# Patient Record
Sex: FEMALE | Race: BLACK OR AFRICAN AMERICAN | NOT HISPANIC OR LATINO | URBAN - METROPOLITAN AREA
[De-identification: names, ages, dates, MRNs, and addresses within clinical notes are randomized per-mention and may not be internally consistent; named-entity substitution may affect disease eponyms.]

---

## 2023-03-31 LAB
ALANINE AMINOTRANSFERASE (SGPT) (U/L) IN SER/PLAS: 11 U/L (ref 7–45)
ALBUMIN (G/DL) IN SER/PLAS: 3.6 G/DL (ref 3.4–5)
ALKALINE PHOSPHATASE (U/L) IN SER/PLAS: 109 U/L (ref 33–136)
ANION GAP IN SER/PLAS: 14 MMOL/L (ref 10–20)
ASPARTATE AMINOTRANSFERASE (SGOT) (U/L) IN SER/PLAS: 14 U/L (ref 9–39)
BILIRUBIN TOTAL (MG/DL) IN SER/PLAS: 0.3 MG/DL (ref 0–1.2)
CALCIUM (MG/DL) IN SER/PLAS: 8.8 MG/DL (ref 8.6–10.3)
CARBON DIOXIDE, TOTAL (MMOL/L) IN SER/PLAS: 27 MMOL/L (ref 21–32)
CHLORIDE (MMOL/L) IN SER/PLAS: 105 MMOL/L (ref 98–107)
CHOLESTEROL (MG/DL) IN SER/PLAS: 223 MG/DL (ref 0–199)
CHOLESTEROL IN HDL (MG/DL) IN SER/PLAS: 58.1 MG/DL
CHOLESTEROL/HDL RATIO: 3.8
CREATININE (MG/DL) IN SER/PLAS: 1.08 MG/DL (ref 0.5–1.05)
ERYTHROCYTE DISTRIBUTION WIDTH (RATIO) BY AUTOMATED COUNT: 14.9 % (ref 11.5–14.5)
ERYTHROCYTE MEAN CORPUSCULAR HEMOGLOBIN CONCENTRATION (G/DL) BY AUTOMATED: 30.9 G/DL (ref 32–36)
ERYTHROCYTE MEAN CORPUSCULAR VOLUME (FL) BY AUTOMATED COUNT: 82 FL (ref 80–100)
ERYTHROCYTES (10*6/UL) IN BLOOD BY AUTOMATED COUNT: 4.21 X10E12/L (ref 4–5.2)
GFR FEMALE: 51 ML/MIN/1.73M2
GLUCOSE (MG/DL) IN SER/PLAS: 95 MG/DL (ref 74–99)
HEMATOCRIT (%) IN BLOOD BY AUTOMATED COUNT: 34.6 % (ref 36–46)
HEMOGLOBIN (G/DL) IN BLOOD: 10.7 G/DL (ref 12–16)
LEUKOCYTES (10*3/UL) IN BLOOD BY AUTOMATED COUNT: 6.8 X10E9/L (ref 4.4–11.3)
NATRIURETIC PEPTIDE B (PG/ML) IN SER/PLAS: 104 PG/ML (ref 0–99)
NON-HDL CHOLESTEROL: 165 MG/DL
PLATELETS (10*3/UL) IN BLOOD AUTOMATED COUNT: 283 X10E9/L (ref 150–450)
POTASSIUM (MMOL/L) IN SER/PLAS: 4.5 MMOL/L (ref 3.5–5.3)
PROTEIN TOTAL: 7.2 G/DL (ref 6.4–8.2)
SODIUM (MMOL/L) IN SER/PLAS: 141 MMOL/L (ref 136–145)
THYROTROPIN (MIU/L) IN SER/PLAS BY DETECTION LIMIT <= 0.05 MIU/L: 1.78 MIU/L (ref 0.44–3.98)
UREA NITROGEN (MG/DL) IN SER/PLAS: 20 MG/DL (ref 6–23)

## 2023-04-01 LAB
CALCIDIOL (25 OH VITAMIN D3) (NG/ML) IN SER/PLAS: 52 NG/ML
ESTIMATED AVERAGE GLUCOSE FOR HBA1C: 134 MG/DL
HEMOGLOBIN A1C/HEMOGLOBIN TOTAL IN BLOOD: 6.3 %

## 2023-08-18 LAB
ALANINE AMINOTRANSFERASE (SGPT) (U/L) IN SER/PLAS: 13 U/L (ref 7–45)
ALBUMIN (G/DL) IN SER/PLAS: 3.5 G/DL (ref 3.4–5)
ALKALINE PHOSPHATASE (U/L) IN SER/PLAS: 101 U/L (ref 33–136)
ANION GAP IN SER/PLAS: 11 MMOL/L (ref 10–20)
ASPARTATE AMINOTRANSFERASE (SGOT) (U/L) IN SER/PLAS: 15 U/L (ref 9–39)
BILIRUBIN TOTAL (MG/DL) IN SER/PLAS: 0.2 MG/DL (ref 0–1.2)
CALCIUM (MG/DL) IN SER/PLAS: 8.9 MG/DL (ref 8.6–10.3)
CARBON DIOXIDE, TOTAL (MMOL/L) IN SER/PLAS: 31 MMOL/L (ref 21–32)
CHLORIDE (MMOL/L) IN SER/PLAS: 102 MMOL/L (ref 98–107)
CREATININE (MG/DL) IN SER/PLAS: 0.97 MG/DL (ref 0.5–1.05)
ERYTHROCYTE DISTRIBUTION WIDTH (RATIO) BY AUTOMATED COUNT: 15.3 % (ref 11.5–14.5)
ERYTHROCYTE MEAN CORPUSCULAR HEMOGLOBIN CONCENTRATION (G/DL) BY AUTOMATED: 31.2 G/DL (ref 32–36)
ERYTHROCYTE MEAN CORPUSCULAR VOLUME (FL) BY AUTOMATED COUNT: 81 FL (ref 80–100)
ERYTHROCYTES (10*6/UL) IN BLOOD BY AUTOMATED COUNT: 4.26 X10E12/L (ref 4–5.2)
ESTIMATED AVERAGE GLUCOSE FOR HBA1C: 134 MG/DL
GFR FEMALE: 58 ML/MIN/1.73M2
GLUCOSE (MG/DL) IN SER/PLAS: 97 MG/DL (ref 74–99)
HEMATOCRIT (%) IN BLOOD BY AUTOMATED COUNT: 34.6 % (ref 36–46)
HEMOGLOBIN (G/DL) IN BLOOD: 10.8 G/DL (ref 12–16)
HEMOGLOBIN A1C/HEMOGLOBIN TOTAL IN BLOOD: 6.3 %
LEUKOCYTES (10*3/UL) IN BLOOD BY AUTOMATED COUNT: 6.1 X10E9/L (ref 4.4–11.3)
PLATELETS (10*3/UL) IN BLOOD AUTOMATED COUNT: 278 X10E9/L (ref 150–450)
POTASSIUM (MMOL/L) IN SER/PLAS: 4.3 MMOL/L (ref 3.5–5.3)
PROTEIN TOTAL: 6.9 G/DL (ref 6.4–8.2)
SODIUM (MMOL/L) IN SER/PLAS: 140 MMOL/L (ref 136–145)
UREA NITROGEN (MG/DL) IN SER/PLAS: 14 MG/DL (ref 6–23)

## 2023-10-20 DIAGNOSIS — K21.9 GASTROESOPHAGEAL REFLUX DISEASE WITHOUT ESOPHAGITIS: Primary | ICD-10-CM

## 2023-10-20 DIAGNOSIS — E55.9 VITAMIN D DEFICIENCY: ICD-10-CM

## 2023-10-23 DIAGNOSIS — K21.9 GASTROESOPHAGEAL REFLUX DISEASE WITHOUT ESOPHAGITIS: Primary | ICD-10-CM

## 2023-10-23 DIAGNOSIS — E55.9 VITAMIN D DEFICIENCY: ICD-10-CM

## 2023-10-23 RX ORDER — PANTOPRAZOLE SODIUM 20 MG/1
20 TABLET, DELAYED RELEASE ORAL DAILY
COMMUNITY
Start: 2023-10-06 | End: 2023-10-23 | Stop reason: SDUPTHER

## 2023-10-23 RX ORDER — PANTOPRAZOLE SODIUM 20 MG/1
20 TABLET, DELAYED RELEASE ORAL DAILY
Qty: 90 TABLET | Refills: 1 | Status: SHIPPED | OUTPATIENT
Start: 2023-10-23 | End: 2023-10-23 | Stop reason: SDUPTHER

## 2023-10-23 RX ORDER — RESVER/WINE/BFL/GRPSD/PC/C/POM 200MG-60MG
5000 CAPSULE ORAL DAILY
Qty: 90 TABLET | Refills: 1 | Status: SHIPPED | OUTPATIENT
Start: 2023-10-23 | End: 2023-10-23 | Stop reason: SDUPTHER

## 2023-10-23 RX ORDER — RESVER/WINE/BFL/GRPSD/PC/C/POM 200MG-60MG
5000 CAPSULE ORAL DAILY
Qty: 90 TABLET | Refills: 1 | Status: SHIPPED | OUTPATIENT
Start: 2023-10-23

## 2023-10-23 RX ORDER — RESVER/WINE/BFL/GRPSD/PC/C/POM 200MG-60MG
5000 CAPSULE ORAL DAILY
COMMUNITY
Start: 2023-10-10 | End: 2023-10-23 | Stop reason: SDUPTHER

## 2023-10-23 RX ORDER — PANTOPRAZOLE SODIUM 20 MG/1
20 TABLET, DELAYED RELEASE ORAL DAILY
Qty: 90 TABLET | Refills: 1 | Status: SHIPPED | OUTPATIENT
Start: 2023-10-23 | End: 2023-11-08 | Stop reason: SDUPTHER

## 2023-10-30 ENCOUNTER — TELEPHONE (OUTPATIENT)
Dept: GERIATRIC MEDICINE | Facility: CLINIC | Age: 83
End: 2023-10-30
Payer: COMMERCIAL

## 2023-10-30 DIAGNOSIS — K08.9 DENTAL DISORDER: Primary | ICD-10-CM

## 2023-10-30 NOTE — TELEPHONE ENCOUNTER
Patient would like to know when is her next appointment with you. She would also like to have a discussion about her dental appt. And states its to far away.

## 2023-10-30 NOTE — TELEPHONE ENCOUNTER
Spoke with pt who reports she saw a dentist who told her she needed to have about 8 teeth removed.  Was given a referral to an oral surgeon at [facility], but can't get in until May and would like a referral to  oral surgery.    Next home visit is scheduled for November 8th-confirmed with edinson

## 2023-11-07 PROBLEM — E11.9 DIABETES MELLITUS (MULTI): Status: ACTIVE | Noted: 2023-11-07

## 2023-11-07 PROBLEM — R26.81 UNSTEADY GAIT: Status: ACTIVE | Noted: 2023-11-07

## 2023-11-07 PROBLEM — K21.9 GERD (GASTROESOPHAGEAL REFLUX DISEASE): Status: ACTIVE | Noted: 2023-11-07

## 2023-11-07 PROBLEM — E55.9 VITAMIN D DEFICIENCY: Status: ACTIVE | Noted: 2023-11-07

## 2023-11-07 PROBLEM — R68.2 DRY MOUTH: Status: ACTIVE | Noted: 2023-11-07

## 2023-11-07 PROBLEM — F41.8 DEPRESSION WITH ANXIETY: Status: ACTIVE | Noted: 2023-11-07

## 2023-11-07 PROBLEM — Z66 DNR (DO NOT RESUSCITATE): Status: ACTIVE | Noted: 2023-11-07

## 2023-11-07 PROBLEM — L85.3 XEROSIS OF SKIN: Status: ACTIVE | Noted: 2023-11-07

## 2023-11-07 PROBLEM — M25.511 SHOULDER PAIN, RIGHT: Status: ACTIVE | Noted: 2023-11-07

## 2023-11-07 PROBLEM — M25.551 HIP PAIN, RIGHT: Status: ACTIVE | Noted: 2023-11-07

## 2023-11-07 PROBLEM — G62.9 PERIPHERAL NEUROPATHY: Status: ACTIVE | Noted: 2023-11-07

## 2023-11-07 PROBLEM — K30 INDIGESTION: Status: ACTIVE | Noted: 2023-11-07

## 2023-11-07 PROBLEM — G89.29 CHRONIC LOW BACK PAIN: Status: ACTIVE | Noted: 2023-11-07

## 2023-11-07 PROBLEM — M54.50 CHRONIC LOW BACK PAIN: Status: ACTIVE | Noted: 2023-11-07

## 2023-11-07 PROBLEM — H04.129 DRY EYE SYNDROME: Status: ACTIVE | Noted: 2023-11-07

## 2023-11-07 PROBLEM — M62.81 GENERALIZED MUSCLE WEAKNESS: Status: ACTIVE | Noted: 2023-11-07

## 2023-11-07 PROBLEM — M16.12 PRIMARY OSTEOARTHRITIS OF LEFT HIP: Status: ACTIVE | Noted: 2023-11-07

## 2023-11-07 PROBLEM — M19.90 ARTHRITIS: Status: ACTIVE | Noted: 2023-11-07

## 2023-11-07 PROBLEM — I10 HTN (HYPERTENSION): Status: ACTIVE | Noted: 2023-11-07

## 2023-11-07 PROBLEM — M79.89 BILATERAL SWELLING OF FEET: Status: ACTIVE | Noted: 2023-11-07

## 2023-11-07 PROBLEM — E78.5 HYPERLIPIDEMIA: Status: ACTIVE | Noted: 2023-11-07

## 2023-11-07 PROBLEM — E66.9 OBESITY: Status: ACTIVE | Noted: 2023-11-07

## 2023-11-07 PROBLEM — I50.32 CHRONIC DIASTOLIC CONGESTIVE HEART FAILURE (MULTI): Status: ACTIVE | Noted: 2023-11-07

## 2023-11-07 PROBLEM — N39.41 URGE INCONTINENCE OF URINE: Status: ACTIVE | Noted: 2023-11-07

## 2023-11-07 PROBLEM — K63.5 COLON POLYPS: Status: ACTIVE | Noted: 2023-11-07

## 2023-11-07 PROBLEM — K59.00 CONSTIPATION: Status: ACTIVE | Noted: 2023-11-07

## 2023-11-07 RX ORDER — GABAPENTIN 300 MG/1
1 CAPSULE ORAL EVERY 8 HOURS
COMMUNITY
Start: 2019-01-21 | End: 2023-11-08 | Stop reason: WASHOUT

## 2023-11-07 RX ORDER — OMEPRAZOLE 20 MG/1
20 CAPSULE, DELAYED RELEASE ORAL
COMMUNITY
Start: 2019-03-14 | End: 2023-11-08 | Stop reason: WASHOUT

## 2023-11-07 RX ORDER — LORATADINE 10 MG/1
10 TABLET ORAL DAILY PRN
COMMUNITY
Start: 2023-09-27

## 2023-11-07 RX ORDER — ACETAMINOPHEN 325 MG/1
650 TABLET ORAL
COMMUNITY
Start: 2018-09-23 | End: 2023-11-08 | Stop reason: SDUPTHER

## 2023-11-07 RX ORDER — ASPIRIN 81 MG/1
81 TABLET ORAL DAILY
COMMUNITY
Start: 2023-04-17

## 2023-11-07 RX ORDER — SIMETHICONE 80 MG
80 TABLET,CHEWABLE ORAL EVERY 4 HOURS PRN
COMMUNITY
Start: 2019-01-21 | End: 2023-11-08 | Stop reason: WASHOUT

## 2023-11-07 RX ORDER — LANOLIN ALCOHOL/MO/W.PET/CERES
CREAM (GRAM) TOPICAL AS NEEDED
COMMUNITY

## 2023-11-07 RX ORDER — GABAPENTIN 100 MG/1
100 CAPSULE ORAL NIGHTLY
COMMUNITY
Start: 2018-09-24 | End: 2023-11-08 | Stop reason: WASHOUT

## 2023-11-07 RX ORDER — MULTIVITAMIN WITH FOLIC ACID 400 MCG
1 TABLET ORAL DAILY
COMMUNITY
Start: 2019-01-21 | End: 2023-11-08 | Stop reason: WASHOUT

## 2023-11-07 RX ORDER — GUAIFENESIN 600 MG/1
600 TABLET, EXTENDED RELEASE ORAL 2 TIMES DAILY PRN
COMMUNITY
Start: 2023-06-10 | End: 2024-01-30 | Stop reason: SDUPTHER

## 2023-11-07 RX ORDER — METFORMIN HYDROCHLORIDE 500 MG/1
500 TABLET ORAL DAILY
COMMUNITY
End: 2023-11-08 | Stop reason: WASHOUT

## 2023-11-07 RX ORDER — SIMVASTATIN 20 MG/1
20 TABLET, FILM COATED ORAL DAILY
COMMUNITY
Start: 2016-06-01 | End: 2023-11-09 | Stop reason: WASHOUT

## 2023-11-07 RX ORDER — DOCUSATE SODIUM 100 MG/1
100 CAPSULE, LIQUID FILLED ORAL 2 TIMES DAILY
COMMUNITY
Start: 2019-01-21 | End: 2023-11-08 | Stop reason: WASHOUT

## 2023-11-07 RX ORDER — BLOOD SUGAR DIAGNOSTIC
STRIP MISCELLANEOUS
COMMUNITY
Start: 2017-01-20 | End: 2023-11-08 | Stop reason: WASHOUT

## 2023-11-07 RX ORDER — ATORVASTATIN CALCIUM 40 MG/1
40 TABLET, FILM COATED ORAL DAILY
COMMUNITY
Start: 2023-07-12 | End: 2023-12-15 | Stop reason: SDUPTHER

## 2023-11-07 RX ORDER — OXYBUTYNIN CHLORIDE 10 MG/1
1 TABLET, EXTENDED RELEASE ORAL DAILY
COMMUNITY
Start: 2016-04-27 | End: 2023-11-08 | Stop reason: WASHOUT

## 2023-11-07 RX ORDER — AMLODIPINE BESYLATE 10 MG/1
1 TABLET ORAL DAILY
COMMUNITY
Start: 2014-03-26 | End: 2023-11-12

## 2023-11-07 RX ORDER — FUROSEMIDE 40 MG/1
1 TABLET ORAL DAILY
COMMUNITY
Start: 2018-09-24 | End: 2023-11-12

## 2023-11-07 RX ORDER — DULOXETIN HYDROCHLORIDE 30 MG/1
CAPSULE, DELAYED RELEASE ORAL
COMMUNITY
Start: 2023-03-29 | End: 2023-11-08 | Stop reason: WASHOUT

## 2023-11-07 RX ORDER — LOSARTAN POTASSIUM 100 MG/1
1 TABLET ORAL DAILY
COMMUNITY
Start: 2014-03-26 | End: 2023-11-12

## 2023-11-07 RX ORDER — ATENOLOL 100 MG/1
1 TABLET ORAL DAILY
COMMUNITY
Start: 2014-03-26 | End: 2023-11-12

## 2023-11-07 RX ORDER — ACETAMINOPHEN 500 MG
1000 TABLET ORAL EVERY 8 HOURS PRN
COMMUNITY
Start: 2019-01-21

## 2023-11-08 ENCOUNTER — OFFICE VISIT (OUTPATIENT)
Dept: GERIATRIC MEDICINE | Facility: CLINIC | Age: 83
End: 2023-11-08
Payer: COMMERCIAL

## 2023-11-08 VITALS
DIASTOLIC BLOOD PRESSURE: 90 MMHG | HEART RATE: 62 BPM | TEMPERATURE: 97.7 F | OXYGEN SATURATION: 94 % | RESPIRATION RATE: 20 BRPM | SYSTOLIC BLOOD PRESSURE: 164 MMHG

## 2023-11-08 DIAGNOSIS — R60.0 BILATERAL LOWER EXTREMITY EDEMA: ICD-10-CM

## 2023-11-08 DIAGNOSIS — G62.9 PERIPHERAL POLYNEUROPATHY: ICD-10-CM

## 2023-11-08 DIAGNOSIS — N18.31 STAGE 3A CHRONIC KIDNEY DISEASE (MULTI): ICD-10-CM

## 2023-11-08 DIAGNOSIS — M19.90 ARTHRITIS: ICD-10-CM

## 2023-11-08 DIAGNOSIS — I10 PRIMARY HYPERTENSION: ICD-10-CM

## 2023-11-08 DIAGNOSIS — E55.9 VITAMIN D DEFICIENCY: ICD-10-CM

## 2023-11-08 DIAGNOSIS — E11.9 TYPE 2 DIABETES MELLITUS WITHOUT COMPLICATION, WITHOUT LONG-TERM CURRENT USE OF INSULIN (MULTI): ICD-10-CM

## 2023-11-08 DIAGNOSIS — K21.9 GASTROESOPHAGEAL REFLUX DISEASE WITHOUT ESOPHAGITIS: ICD-10-CM

## 2023-11-08 DIAGNOSIS — I50.32 CHRONIC DIASTOLIC CONGESTIVE HEART FAILURE (MULTI): Primary | ICD-10-CM

## 2023-11-08 PROCEDURE — 3077F SYST BP >= 140 MM HG: CPT | Performed by: NURSE PRACTITIONER

## 2023-11-08 PROCEDURE — 90471 IMMUNIZATION ADMIN: CPT | Performed by: NURSE PRACTITIONER

## 2023-11-08 PROCEDURE — 1160F RVW MEDS BY RX/DR IN RCRD: CPT | Performed by: NURSE PRACTITIONER

## 2023-11-08 PROCEDURE — 1159F MED LIST DOCD IN RCRD: CPT | Performed by: NURSE PRACTITIONER

## 2023-11-08 PROCEDURE — 3080F DIAST BP >= 90 MM HG: CPT | Performed by: NURSE PRACTITIONER

## 2023-11-08 PROCEDURE — 99350 HOME/RES VST EST HIGH MDM 60: CPT | Performed by: NURSE PRACTITIONER

## 2023-11-08 RX ORDER — PANTOPRAZOLE SODIUM 20 MG/1
20 TABLET, DELAYED RELEASE ORAL DAILY
Qty: 90 TABLET | Refills: 1 | Status: SHIPPED | OUTPATIENT
Start: 2023-11-08 | End: 2024-01-17

## 2023-11-09 PROBLEM — R60.0 BILATERAL LOWER EXTREMITY EDEMA: Status: ACTIVE | Noted: 2023-11-09

## 2023-11-09 PROBLEM — N18.31 STAGE 3A CHRONIC KIDNEY DISEASE (MULTI): Status: ACTIVE | Noted: 2023-11-09

## 2023-11-09 PROBLEM — R60.0 BILATERAL LOWER EXTREMITY EDEMA: Status: ACTIVE | Noted: 2023-11-07

## 2023-11-09 NOTE — PROGRESS NOTES
"Subjective   Patient ID: Eva Moran is a 83 y.o. female who presents for Follow-up.    HPI  Patient reports that she has a dental appointment next Friday for tooth extractions  Is working on getting an ID for paratransit; has already done all the paperwork  Son-in-law can take her if she is unable to arrange for paratransit    Patient reports that \"every day is a struggle\"-patient reports that she just does not want to move  No pain when sitting still, but patient reports severe hip pain as soon as she tries to stand or do anything  Feels frustrated because she is content to be \"a lump\"    Patient is not ambulating at all, but just stands and pivots to her chair and then self propels her wheelchair with her feet  Has hip pain when she stands, but is actually worse when she goes to sit back down    Feet are numb and patient reports a sensation of burning in her toes-this is chronic and unchanged  Uses Tylenol for pain  Also using a topical cream-glucosamine and Boswellia from [employer]-patient feels that that is very helpful    Appetite is okay  Bowels are \"a little better\"-not as hard  Is using a fiber supplement-\"[employer] digestive essentials\"    Patient reports that her mood is up and down, but thinks that she is mostly doing well with a good mood    Has a new wheelchair lift-was installed in June  Someone gave her a motorized wheelchair, but the arms are too short and is difficult for her to stand up from it without help    Chronic stable cough productive for white sputum at times  Stable ARIZA, but no SOB at rest    Recently had a large blister on her proximal thigh, but then it burst in resolved  Had one like this a few years ago, but none since until recently    Has not had a HHA for a while-her aide is signing up for a new agency in the patient's going to try to see if that agency is in network  Both her daughter and son-in-law have been helping her in the interim  Objective   /90   Pulse 62   Temp " 36.5 °C (97.7 °F)   Resp 20   SpO2 94%       Chemistry    Lab Results   Component Value Date/Time     08/18/2023 1510    K 4.3 08/18/2023 1510     08/18/2023 1510    CO2 31 08/18/2023 1510    BUN 14 08/18/2023 1510    CREATININE 0.97 08/18/2023 1510    Lab Results   Component Value Date/Time    CALCIUM 8.9 08/18/2023 1510    ALKPHOS 101 08/18/2023 1510    AST 15 08/18/2023 1510    ALT 13 08/18/2023 1510    BILITOT 0.2 08/18/2023 1510        Lab Results   Component Value Date    WBC 6.1 08/18/2023    HGB 10.8 (L) 08/18/2023    HCT 34.6 (L) 08/18/2023    MCV 81 08/18/2023     08/18/2023     Lab Results   Component Value Date    HGBA1C 6.3 (A) 08/18/2023      Physical Exam  alert, obese, aaf seen in w/c in living room in nad  Good mood, joking  eyes without erythema or drainage  no nasal drainage noted  no coughing during visit  mm moist  no jvd  rrr  bs cta bilat, not labored  obese, soft, nt, nd  edema bilateral legs  -RLE 2-3+2/3 to the knee  -LLE 2+1/2 to the knee  visible skin intact  Patient was able to stand from her wheelchair independently but with maximum effort-groans with movement to stand and also with sitting down  CALLEJAS, but is able to lift her left leg easier than her right  Assessment/Plan     Chronic diastolic congestive heart failure (CMS/HCC)/Bilateral lower extremity edema  -Labs were okay  -Patient continues to have stable lower extremity edema but is unclear how much is venous insufficiency from sleeping in the chair and how much is CHF; no SOB, stable ARIZA  - Continue Lasix 40 mg daily    CKD stage 3  -8/18/2023 CR = 0.97, GFR = 58-is stable/slightly improved from previous  - Maintain hydration  - Avoid nephrotoxic medications especially NSAIDs    Gastroesophageal reflux disease without esophagitis  -No recent symptoms, but patient requesting a refill on her pantoprazole  - pantoprazole (ProtoNix) 20 mg EC tablet; Take 1 tablet (20 mg) by mouth once daily.  Dispense: 90 tablet;  Refill:     Primary hypertension  -Slightly high today, but has been well controlled in the past-consider addition/changes to medication if BP still up at next visit  - Continue atenolol 100 mg daily   -Continue losartan 100 mg daily  - Continue amlodipine 10 mg daily    Type 2 diabetes mellitus without complication, without long-term current use of insulin (CMS/[facility])  -8/18/2023 hemoglobin A1c = 6.3  - Patient is not on any medications and does not check her blood sugar  - Recheck hemoglobin A1c every 6 to 12 months    Vitamin D deficiency  -3/22/2023 vit D = 52-continue vitamin D 5000 units daily  - Recheck vitamin D level with next labs    Peripheral polyneuropathy/Arthritis  -Pain reasonably well controlled with Tylenol and use of topical agents

## 2023-11-12 DIAGNOSIS — I50.32 CHRONIC DIASTOLIC CONGESTIVE HEART FAILURE (MULTI): ICD-10-CM

## 2023-11-12 DIAGNOSIS — I10 PRIMARY HYPERTENSION: Primary | ICD-10-CM

## 2023-11-12 RX ORDER — ATENOLOL 100 MG/1
100 TABLET ORAL DAILY
Qty: 100 TABLET | Refills: 1 | Status: SHIPPED | OUTPATIENT
Start: 2023-11-12 | End: 2023-12-20 | Stop reason: SDUPTHER

## 2023-11-12 RX ORDER — LOSARTAN POTASSIUM 100 MG/1
100 TABLET ORAL DAILY
Qty: 100 TABLET | Refills: 1 | Status: SHIPPED | OUTPATIENT
Start: 2023-11-12 | End: 2023-12-20 | Stop reason: SDUPTHER

## 2023-11-12 RX ORDER — AMLODIPINE BESYLATE 10 MG/1
10 TABLET ORAL DAILY
Qty: 100 TABLET | Refills: 1 | Status: SHIPPED | OUTPATIENT
Start: 2023-11-12 | End: 2023-12-20 | Stop reason: SDUPTHER

## 2023-11-12 RX ORDER — FUROSEMIDE 40 MG/1
40 TABLET ORAL DAILY
Qty: 100 TABLET | Refills: 1 | Status: SHIPPED | OUTPATIENT
Start: 2023-11-12 | End: 2023-12-20 | Stop reason: SDUPTHER

## 2023-12-15 DIAGNOSIS — E78.5 HYPERLIPIDEMIA, UNSPECIFIED HYPERLIPIDEMIA TYPE: Primary | ICD-10-CM

## 2023-12-15 RX ORDER — ATORVASTATIN CALCIUM 40 MG/1
40 TABLET, FILM COATED ORAL DAILY
Qty: 90 TABLET | Refills: 1 | Status: SHIPPED | OUTPATIENT
Start: 2023-12-15 | End: 2024-01-08 | Stop reason: SDUPTHER

## 2023-12-18 ENCOUNTER — TELEPHONE (OUTPATIENT)
Dept: GERIATRIC MEDICINE | Facility: CLINIC | Age: 83
End: 2023-12-18

## 2023-12-18 DIAGNOSIS — I10 PRIMARY HYPERTENSION: ICD-10-CM

## 2023-12-18 DIAGNOSIS — I50.32 CHRONIC DIASTOLIC CONGESTIVE HEART FAILURE (MULTI): ICD-10-CM

## 2023-12-19 NOTE — TELEPHONE ENCOUNTER
Message left on Pt's voicemail to return my call with the name and phone number of the pharmacy she would like scripts sent.    Patient left a message stating she would like her prescriptions sent to [facility] phone number [phone] and fax number  [phone].

## 2023-12-20 RX ORDER — LOSARTAN POTASSIUM 100 MG/1
100 TABLET ORAL DAILY
Qty: 100 TABLET | Refills: 1 | Status: SHIPPED | OUTPATIENT
Start: 2023-12-20

## 2023-12-20 RX ORDER — FUROSEMIDE 40 MG/1
40 TABLET ORAL DAILY
Qty: 100 TABLET | Refills: 1 | Status: SHIPPED | OUTPATIENT
Start: 2023-12-20

## 2023-12-20 RX ORDER — ATENOLOL 100 MG/1
100 TABLET ORAL DAILY
Qty: 100 TABLET | Refills: 1 | Status: SHIPPED | OUTPATIENT
Start: 2023-12-20

## 2023-12-20 RX ORDER — AMLODIPINE BESYLATE 10 MG/1
10 TABLET ORAL DAILY
Qty: 100 TABLET | Refills: 1 | Status: SHIPPED | OUTPATIENT
Start: 2023-12-20

## 2023-12-28 NOTE — TELEPHONE ENCOUNTER
Pt reports headaches off and on-not the worst headache she ever had, but is a different location.  No dizziness and does not affect her vision.    Pt agreed to have someone check her bp tomorrow and will call if >150/90-if so, consider adding hydrochlorothiazide 12.5 mg    Pt also reports falling asleep about 4 am and sleeping until noon-recommended she take melatonin 5 mg at bedtime    Visit is scheduled for 1/8-pt to call if any additional issues before that

## 2024-01-08 ENCOUNTER — OFFICE VISIT (OUTPATIENT)
Dept: GERIATRIC MEDICINE | Facility: CLINIC | Age: 84
End: 2024-01-08
Payer: COMMERCIAL

## 2024-01-08 VITALS
TEMPERATURE: 97.7 F | RESPIRATION RATE: 20 BRPM | OXYGEN SATURATION: 94 % | DIASTOLIC BLOOD PRESSURE: 72 MMHG | SYSTOLIC BLOOD PRESSURE: 118 MMHG | HEART RATE: 54 BPM

## 2024-01-08 DIAGNOSIS — K21.9 GASTROESOPHAGEAL REFLUX DISEASE WITHOUT ESOPHAGITIS: ICD-10-CM

## 2024-01-08 DIAGNOSIS — E55.9 VITAMIN D DEFICIENCY: ICD-10-CM

## 2024-01-08 DIAGNOSIS — E11.9 TYPE 2 DIABETES MELLITUS WITHOUT COMPLICATION, WITHOUT LONG-TERM CURRENT USE OF INSULIN (MULTI): ICD-10-CM

## 2024-01-08 DIAGNOSIS — E78.5 HYPERLIPIDEMIA, UNSPECIFIED HYPERLIPIDEMIA TYPE: ICD-10-CM

## 2024-01-08 DIAGNOSIS — I50.32 CHRONIC DIASTOLIC CONGESTIVE HEART FAILURE (MULTI): Primary | ICD-10-CM

## 2024-01-08 DIAGNOSIS — N18.31 STAGE 3A CHRONIC KIDNEY DISEASE (MULTI): ICD-10-CM

## 2024-01-08 DIAGNOSIS — I10 PRIMARY HYPERTENSION: ICD-10-CM

## 2024-01-08 DIAGNOSIS — R60.0 BILATERAL LOWER EXTREMITY EDEMA: ICD-10-CM

## 2024-01-08 PROCEDURE — 1160F RVW MEDS BY RX/DR IN RCRD: CPT | Performed by: NURSE PRACTITIONER

## 2024-01-08 PROCEDURE — 1159F MED LIST DOCD IN RCRD: CPT | Performed by: NURSE PRACTITIONER

## 2024-01-08 PROCEDURE — 3078F DIAST BP <80 MM HG: CPT | Performed by: NURSE PRACTITIONER

## 2024-01-08 PROCEDURE — 99349 HOME/RES VST EST MOD MDM 40: CPT | Performed by: NURSE PRACTITIONER

## 2024-01-08 PROCEDURE — 3074F SYST BP LT 130 MM HG: CPT | Performed by: NURSE PRACTITIONER

## 2024-01-08 RX ORDER — ATORVASTATIN CALCIUM 40 MG/1
40 TABLET, FILM COATED ORAL DAILY
Qty: 90 TABLET | Refills: 1 | Status: SHIPPED | OUTPATIENT
Start: 2024-01-08 | End: 2024-03-18

## 2024-01-09 NOTE — PROGRESS NOTES
"Subjective   Patient ID: Eva Moran is a 83 y.o. female who presents for Follow-up.    HPI  Pt had called the office 1-2 weeks ago with c/o head ache  Reports that she \"felt like she was floating\"  That has since resolved  Looking back pt feels like it is because her HHA at that time had a lot of personal drama she brought with her and pt found herself feeling stressed and anxious  HHA no longer comes and she feels like everything is peaceful again    Doesn't have a HHA right now, but her dtr and son-in-law who live upstairs help her    Went to the dentist 3 times, but nothing was done-had scheduling issues and then said that she needed to go to a place called [facility] so she could get them all done at once instead of having multiple appmnts  Pt has not made an appmnt yet  Has her [employer] paratransit set up, but her son-in-law can take her if needed    Sometimes she is constipated, but she is afraid to use laxatives because she is afraid she will have an accident because it takes her so long to get to the bathroom    Pt bought some molasses- she puts it in hot water and drinks it \"like coffee\"-is hoping that will give her more iron    Stable chronic cough productive for white sputum  Becomes sob with minimal exertion, but not at rest-stable pattern  Pt feels like she has been declining over recent years-not as strong as she was, but nothing specific    Appetite is OK  Gets frustrated with herself because sometimes she \"eats all day\"    Pain is mostly stable-seems to be a little better recently    Pt reports that sometimes she feels like it is difficult to think clearly and other times she feels very sharp  Once she totally forgot to pay the rent and that never happens to her    Is out of atorvastatin and needs that to be ordered  Objective   /72   Pulse 54   Temp 36.5 °C (97.7 °F)   Resp 20   SpO2 94%     Physical Exam  alert, obese, aaf seen in w/c in living room in nad  Good mood, joking  eyes " without erythema or drainage  no coughing during visit  mm moist  no jvd noted  rrr  bs cta bilat, not labored  obese, soft, nt, nd  edema bilateral legs-moderate nonpitting 3/4 to knees bilaterally  visible skin intact   Assessment/Plan     Chronic diastolic congestive heart failure (CMS/HCC)/Bilateral lower extremity edema  -Patient continues to have stable lower extremity edema but is unclear how much is venous insufficiency from sleeping in the chair and how much is CHF; no SOB, stable ARIZA  - Continue Lasix 40 mg daily     CKD stage 3  -8/18/2023 CR = 0.97, GFR = 58-is stable/slightly improved from previous  - Maintain hydration  - Avoid nephrotoxic medications especially NSAIDs  -check labs at next visit     Gastroesophageal reflux disease without esophagitis  -No recent symptoms,  - continue pantoprazole (ProtoNix) 20 mg EC tablet; Take 1 tablet (20 mg) by mouth once daily.       Primary hypertension  - bp well controlled, but hr on low side  -pt to check bp and hr and call us if bp<110  or >150 systolic or hr<55  - Continue atenolol 100 mg daily-may need to decrease if continues with low heart rate  -Continue losartan 100 mg daily  - Continue amlodipine 10 mg daily     Type 2 diabetes mellitus without complication, without long-term current use of insulin (CMS/[facility])  -8/18/2023 hemoglobin A1c = 6.3  - Patient is not on any medications and does not check her blood sugar  - Recheck hemoglobin A1c at next visit     Vitamin D deficiency  -3/22/2023 vit D = 52-continue vitamin D 5000 units daily  - Recheck vitamin D level at next visit     Peripheral polyneuropathy/Arthritis  -Pain reasonably well controlled with Tylenol and use of topical agents

## 2024-01-09 NOTE — PATIENT INSTRUCTIONS
I will plan to see you again in 2-3 months, but please call if any issues in the meantime    Please check your bp and heart rate and call us if the top number is <110 or >150 or if the heart rate is <55

## 2024-01-17 DIAGNOSIS — K21.9 GASTROESOPHAGEAL REFLUX DISEASE WITHOUT ESOPHAGITIS: ICD-10-CM

## 2024-01-17 RX ORDER — PANTOPRAZOLE SODIUM 20 MG/1
20 TABLET, DELAYED RELEASE ORAL DAILY
Qty: 100 TABLET | Refills: 1 | Status: SHIPPED | OUTPATIENT
Start: 2024-01-17 | End: 2024-03-20 | Stop reason: DRUGHIGH

## 2024-01-30 DIAGNOSIS — R05.1 ACUTE COUGH: Primary | ICD-10-CM

## 2024-01-30 RX ORDER — GUAIFENESIN 600 MG/1
600 TABLET, EXTENDED RELEASE ORAL 2 TIMES DAILY PRN
Qty: 60 TABLET | Refills: 3 | Status: SHIPPED | OUTPATIENT
Start: 2024-01-30

## 2024-02-28 ENCOUNTER — TELEPHONE (OUTPATIENT)
Dept: GERIATRIC MEDICINE | Facility: CLINIC | Age: 84
End: 2024-02-28

## 2024-02-28 DIAGNOSIS — M25.531 RIGHT WRIST PAIN: ICD-10-CM

## 2024-02-28 DIAGNOSIS — I50.32 CHRONIC DIASTOLIC CONGESTIVE HEART FAILURE (MULTI): ICD-10-CM

## 2024-02-28 DIAGNOSIS — F41.8 DEPRESSION WITH ANXIETY: ICD-10-CM

## 2024-02-28 DIAGNOSIS — N18.31 STAGE 3A CHRONIC KIDNEY DISEASE (MULTI): ICD-10-CM

## 2024-02-28 DIAGNOSIS — E55.9 VITAMIN D DEFICIENCY: ICD-10-CM

## 2024-02-28 DIAGNOSIS — E11.9 TYPE 2 DIABETES MELLITUS WITHOUT COMPLICATION, WITHOUT LONG-TERM CURRENT USE OF INSULIN (MULTI): ICD-10-CM

## 2024-02-28 DIAGNOSIS — E78.5 HYPERLIPIDEMIA, UNSPECIFIED HYPERLIPIDEMIA TYPE: ICD-10-CM

## 2024-02-28 DIAGNOSIS — R31.9 HEMATURIA, UNSPECIFIED TYPE: ICD-10-CM

## 2024-02-28 DIAGNOSIS — R53.83 LETHARGY: Primary | ICD-10-CM

## 2024-02-28 NOTE — TELEPHONE ENCOUNTER
Pt feels like she is sleepy all the time and is concerned  Had brown urine over night  Pain in R wrist for about 3 weeks-no injury, some minor swelling, but no erythema, no h/o gout    Will get an xray of R wrist and labs and a urine  Pt encouraged to continue R wrist splint with use of topicals and heat/cold, tylenol prn  Consider OT consult

## 2024-02-29 ENCOUNTER — HOME HEALTH ADMISSION (OUTPATIENT)
Dept: HOME HEALTH SERVICES | Facility: HOME HEALTH | Age: 84
End: 2024-02-29
Payer: COMMERCIAL

## 2024-02-29 ENCOUNTER — HOME CARE VISIT (OUTPATIENT)
Dept: HOME HEALTH SERVICES | Facility: HOME HEALTH | Age: 84
End: 2024-02-29
Payer: COMMERCIAL

## 2024-02-29 ENCOUNTER — LAB (OUTPATIENT)
Dept: LAB | Facility: LAB | Age: 84
End: 2024-02-29
Payer: COMMERCIAL

## 2024-02-29 DIAGNOSIS — R31.9 HEMATURIA, UNSPECIFIED TYPE: ICD-10-CM

## 2024-02-29 DIAGNOSIS — R53.83 LETHARGY: ICD-10-CM

## 2024-02-29 DIAGNOSIS — I50.32 CHRONIC DIASTOLIC CONGESTIVE HEART FAILURE (MULTI): ICD-10-CM

## 2024-02-29 DIAGNOSIS — E11.9 TYPE 2 DIABETES MELLITUS WITHOUT COMPLICATION, WITHOUT LONG-TERM CURRENT USE OF INSULIN (MULTI): ICD-10-CM

## 2024-02-29 DIAGNOSIS — E78.5 HYPERLIPIDEMIA, UNSPECIFIED HYPERLIPIDEMIA TYPE: ICD-10-CM

## 2024-02-29 DIAGNOSIS — N18.31 STAGE 3A CHRONIC KIDNEY DISEASE (MULTI): ICD-10-CM

## 2024-02-29 DIAGNOSIS — E55.9 VITAMIN D DEFICIENCY: ICD-10-CM

## 2024-02-29 LAB
25(OH)D3 SERPL-MCNC: 62 NG/ML (ref 30–100)
ALBUMIN SERPL BCP-MCNC: 3.5 G/DL (ref 3.4–5)
ALP SERPL-CCNC: 98 U/L (ref 33–136)
ALT SERPL W P-5'-P-CCNC: 13 U/L (ref 7–45)
ANION GAP SERPL CALC-SCNC: 14 MMOL/L (ref 10–20)
APPEARANCE UR: ABNORMAL
AST SERPL W P-5'-P-CCNC: 12 U/L (ref 9–39)
BILIRUB SERPL-MCNC: 0.3 MG/DL (ref 0–1.2)
BILIRUB UR STRIP.AUTO-MCNC: NEGATIVE MG/DL
BUN SERPL-MCNC: 17 MG/DL (ref 6–23)
CALCIUM SERPL-MCNC: 9.7 MG/DL (ref 8.6–10.3)
CHLORIDE SERPL-SCNC: 102 MMOL/L (ref 98–107)
CHOLEST SERPL-MCNC: 224 MG/DL (ref 0–199)
CHOLESTEROL/HDL RATIO: 3.7
CO2 SERPL-SCNC: 26 MMOL/L (ref 21–32)
COLOR UR: YELLOW
CREAT SERPL-MCNC: 0.97 MG/DL (ref 0.5–1.05)
EGFRCR SERPLBLD CKD-EPI 2021: 58 ML/MIN/1.73M*2
ERYTHROCYTE [DISTWIDTH] IN BLOOD BY AUTOMATED COUNT: 15 % (ref 11.5–14.5)
GLUCOSE SERPL-MCNC: 132 MG/DL (ref 74–99)
GLUCOSE UR STRIP.AUTO-MCNC: NEGATIVE MG/DL
HCT VFR BLD AUTO: 33.8 % (ref 36–46)
HDLC SERPL-MCNC: 60 MG/DL
HGB BLD-MCNC: 10.6 G/DL (ref 12–16)
KETONES UR STRIP.AUTO-MCNC: NEGATIVE MG/DL
LDLC SERPL CALC-MCNC: 141 MG/DL
LEUKOCYTE ESTERASE UR QL STRIP.AUTO: NEGATIVE
MCH RBC QN AUTO: 25.4 PG (ref 26–34)
MCHC RBC AUTO-ENTMCNC: 31.4 G/DL (ref 32–36)
MCV RBC AUTO: 81 FL (ref 80–100)
NITRITE UR QL STRIP.AUTO: NEGATIVE
NON HDL CHOLESTEROL: 164 MG/DL (ref 0–149)
NRBC BLD-RTO: 0 /100 WBCS (ref 0–0)
PH UR STRIP.AUTO: 6 [PH]
PLATELET # BLD AUTO: 283 X10*3/UL (ref 150–450)
POTASSIUM SERPL-SCNC: 4.1 MMOL/L (ref 3.5–5.3)
PROT SERPL-MCNC: 7.3 G/DL (ref 6.4–8.2)
PROT UR STRIP.AUTO-MCNC: NEGATIVE MG/DL
RBC # BLD AUTO: 4.18 X10*6/UL (ref 4–5.2)
RBC # UR STRIP.AUTO: NEGATIVE /UL
SODIUM SERPL-SCNC: 138 MMOL/L (ref 136–145)
SP GR UR STRIP.AUTO: 1.01
TRIGL SERPL-MCNC: 115 MG/DL (ref 0–149)
TSH SERPL-ACNC: 2.73 MIU/L (ref 0.44–3.98)
UROBILINOGEN UR STRIP.AUTO-MCNC: <2 MG/DL
VLDL: 23 MG/DL (ref 0–40)
WBC # BLD AUTO: 5.4 X10*3/UL (ref 4.4–11.3)

## 2024-02-29 PROCEDURE — 80053 COMPREHEN METABOLIC PANEL: CPT

## 2024-02-29 PROCEDURE — 81003 URINALYSIS AUTO W/O SCOPE: CPT

## 2024-02-29 PROCEDURE — 84443 ASSAY THYROID STIM HORMONE: CPT

## 2024-02-29 PROCEDURE — 36415 COLL VENOUS BLD VENIPUNCTURE: CPT

## 2024-02-29 PROCEDURE — 83036 HEMOGLOBIN GLYCOSYLATED A1C: CPT

## 2024-02-29 PROCEDURE — 80061 LIPID PANEL: CPT

## 2024-02-29 PROCEDURE — 85027 COMPLETE CBC AUTOMATED: CPT

## 2024-02-29 PROCEDURE — 82306 VITAMIN D 25 HYDROXY: CPT

## 2024-02-29 PROCEDURE — 87086 URINE CULTURE/COLONY COUNT: CPT

## 2024-03-01 LAB
BACTERIA UR CULT: ABNORMAL
EST. AVERAGE GLUCOSE BLD GHB EST-MCNC: 137 MG/DL
HBA1C MFR BLD: 6.4 %

## 2024-03-17 DIAGNOSIS — E78.5 HYPERLIPIDEMIA, UNSPECIFIED HYPERLIPIDEMIA TYPE: ICD-10-CM

## 2024-03-18 ENCOUNTER — OFFICE VISIT (OUTPATIENT)
Dept: GERIATRIC MEDICINE | Facility: CLINIC | Age: 84
End: 2024-03-18
Payer: COMMERCIAL

## 2024-03-18 DIAGNOSIS — N18.31 STAGE 3A CHRONIC KIDNEY DISEASE (MULTI): ICD-10-CM

## 2024-03-18 DIAGNOSIS — M19.90 ARTHRITIS: ICD-10-CM

## 2024-03-18 DIAGNOSIS — I50.32 CHRONIC DIASTOLIC CONGESTIVE HEART FAILURE (MULTI): Primary | ICD-10-CM

## 2024-03-18 DIAGNOSIS — E11.9 TYPE 2 DIABETES MELLITUS WITHOUT COMPLICATION, WITHOUT LONG-TERM CURRENT USE OF INSULIN (MULTI): ICD-10-CM

## 2024-03-18 DIAGNOSIS — I10 PRIMARY HYPERTENSION: ICD-10-CM

## 2024-03-18 DIAGNOSIS — K21.9 GASTROESOPHAGEAL REFLUX DISEASE WITHOUT ESOPHAGITIS: ICD-10-CM

## 2024-03-18 DIAGNOSIS — E55.9 VITAMIN D DEFICIENCY: ICD-10-CM

## 2024-03-18 PROCEDURE — 1159F MED LIST DOCD IN RCRD: CPT | Performed by: NURSE PRACTITIONER

## 2024-03-18 PROCEDURE — 3074F SYST BP LT 130 MM HG: CPT | Performed by: NURSE PRACTITIONER

## 2024-03-18 PROCEDURE — 1157F ADVNC CARE PLAN IN RCRD: CPT | Performed by: NURSE PRACTITIONER

## 2024-03-18 PROCEDURE — 3079F DIAST BP 80-89 MM HG: CPT | Performed by: NURSE PRACTITIONER

## 2024-03-18 PROCEDURE — 1160F RVW MEDS BY RX/DR IN RCRD: CPT | Performed by: NURSE PRACTITIONER

## 2024-03-18 PROCEDURE — 99349 HOME/RES VST EST MOD MDM 40: CPT | Performed by: NURSE PRACTITIONER

## 2024-03-18 RX ORDER — ATORVASTATIN CALCIUM 40 MG/1
40 TABLET, FILM COATED ORAL DAILY
Qty: 90 TABLET | Refills: 1 | Status: SHIPPED | OUTPATIENT
Start: 2024-03-18

## 2024-03-20 VITALS
DIASTOLIC BLOOD PRESSURE: 80 MMHG | RESPIRATION RATE: 20 BRPM | TEMPERATURE: 97.9 F | HEART RATE: 56 BPM | SYSTOLIC BLOOD PRESSURE: 122 MMHG | OXYGEN SATURATION: 93 %

## 2024-03-20 RX ORDER — PANTOPRAZOLE SODIUM 20 MG/1
20 TABLET, DELAYED RELEASE ORAL DAILY PRN
COMMUNITY

## 2024-03-20 NOTE — PROGRESS NOTES
"Subjective   Patient ID: Eva Moran is a 83 y.o. female who presents for Follow-up.    HPI  Patient has been having pain in her right wrist-using splint, topicals, heat/cold and Tylenol with some improvement  Still feels like she has no  strength in her right hand    Patient reports feeling short of breath at times especially with activity, but sometimes at rest  Feels like this is a stable pattern for her  Has a chronic cough productive for white sputum-unchanged from baseline    Bowels moving okay-used to be twice per week but now is about every other day  Takes bromelain and that seems to help  Also eats prunes    Good appetite  Only uses the pantoprazole as needed    Patient reports that her pain is doing better-she can ambulate with a rollator \"without screaming\"  Doing some exercises using weights and bands    Skin okay without any open areas or rashes  Edema stable    Her dental appointment has been canceled and rescheduled again-it is now been moved April 3  Objective   /80 (BP Cuff Size: Large adult)   Pulse 56   Temp 36.6 °C (97.9 °F)   Resp 20   SpO2 93%       Chemistry    Lab Results   Component Value Date/Time     02/29/2024 1349    K 4.1 02/29/2024 1349     02/29/2024 1349    CO2 26 02/29/2024 1349    BUN 17 02/29/2024 1349    CREATININE 0.97 02/29/2024 1349    Lab Results   Component Value Date/Time    CALCIUM 9.7 02/29/2024 1349    ALKPHOS 98 02/29/2024 1349    AST 12 02/29/2024 1349    ALT 13 02/29/2024 1349    BILITOT 0.3 02/29/2024 1349        Lab Results   Component Value Date    WBC 5.4 02/29/2024    HGB 10.6 (L) 02/29/2024    HCT 33.8 (L) 02/29/2024    MCV 81 02/29/2024     02/29/2024     Lab Results   Component Value Date    CHOL 224 (H) 02/29/2024    CHOL 223 (H) 03/31/2023    CHOL 245 (H) 11/07/2017     Lab Results   Component Value Date    HDL 60.0 02/29/2024    HDL 58.1 03/31/2023    HDL 58.9 11/07/2017     Lab Results   Component Value Date    LDLCALC " "141 (H) 02/29/2024     Lab Results   Component Value Date    TRIG 115 02/29/2024    TRIG 145 11/07/2017     No components found for: \"CHOLHDL\"    Lab Results   Component Value Date    HGBA1C 6.4 (H) 02/29/2024     Lab Results   Component Value Date    TSH 2.73 02/29/2024     Physical Exam  alert, obese, aaf seen in w/c in living room in nad  Good mood, joking  eyes without erythema or drainage  no coughing during visit  mm moist  no jvd noted  rrr  bs cta bilat, not labored  obese, soft, nt, nd  moderate to large nonpitting edema one third to her knees bilaterally  visible skin intact   Assessment/Plan     Chronic diastolic congestive heart failure (CMS/HCC)/Bilateral lower extremity edema  -Patient continues to have stable lower extremity edema but is unclear how much is venous insufficiency from sleeping in the chair and how much is CHF  - Labs stable  - Continue Lasix 40 mg daily     CKD stage 3  - 2/29/2024 CR = 0.97 GFR = 58-stable  - Maintain hydration  - Avoid nephrotoxic medications especially NSAIDs     Gastroesophageal reflux disease without esophagitis  -No recent symptoms,  - continue pantoprazole (ProtoNix) 20 mg daily as needed     Primary hypertension  - bp well controlled  - Continue atenolol 100 mg daily  - Continue losartan 100 mg daily  - Continue amlodipine 10 mg daily     Type 2 diabetes mellitus without complication, without long-term current use of insulin (CMS/HCC)  - 3/29/2024 A1c = 6.4  - Patient is not on any medications and does not check her blood sugar     Vitamin D deficiency  -2/29/2024 vit D = 62  - continue vitamin D 5000 units daily     Peripheral polyneuropathy/Arthritis  -Pain reasonably well controlled with Tylenol and use of topical agents   "

## 2024-04-02 ENCOUNTER — TELEPHONE (OUTPATIENT)
Dept: GERIATRIC MEDICINE | Facility: CLINIC | Age: 84
End: 2024-04-02
Payer: COMMERCIAL

## 2024-06-07 ENCOUNTER — TELEPHONE (OUTPATIENT)
Dept: GERIATRIC MEDICINE | Facility: CLINIC | Age: 84
End: 2024-06-07

## 2024-06-07 DIAGNOSIS — E78.5 HYPERLIPIDEMIA, UNSPECIFIED HYPERLIPIDEMIA TYPE: ICD-10-CM

## 2024-06-07 DIAGNOSIS — I10 PRIMARY HYPERTENSION: ICD-10-CM

## 2024-06-07 DIAGNOSIS — I50.32 CHRONIC DIASTOLIC CONGESTIVE HEART FAILURE (MULTI): ICD-10-CM

## 2024-06-07 RX ORDER — ATENOLOL 100 MG/1
100 TABLET ORAL DAILY
Qty: 90 TABLET | Refills: 3 | Status: CANCELLED | OUTPATIENT
Start: 2024-06-07 | End: 2025-06-07

## 2024-06-07 RX ORDER — LOSARTAN POTASSIUM 100 MG/1
100 TABLET ORAL DAILY
Qty: 90 TABLET | Refills: 3 | Status: CANCELLED | OUTPATIENT
Start: 2024-06-07 | End: 2025-06-07

## 2024-06-07 RX ORDER — AMLODIPINE BESYLATE 10 MG/1
10 TABLET ORAL DAILY
Qty: 90 TABLET | Refills: 3 | Status: CANCELLED | OUTPATIENT
Start: 2024-06-07 | End: 2025-06-07

## 2024-06-07 RX ORDER — FUROSEMIDE 40 MG/1
40 TABLET ORAL DAILY
Qty: 90 TABLET | Refills: 3 | Status: CANCELLED | OUTPATIENT
Start: 2024-06-07 | End: 2025-06-07

## 2024-06-07 RX ORDER — ATORVASTATIN CALCIUM 40 MG/1
40 TABLET, FILM COATED ORAL DAILY
Qty: 90 TABLET | Refills: 3 | Status: CANCELLED | OUTPATIENT
Start: 2024-06-07 | End: 2025-06-07

## 2024-06-26 DIAGNOSIS — I50.32 CHRONIC DIASTOLIC CONGESTIVE HEART FAILURE (MULTI): ICD-10-CM

## 2024-06-26 DIAGNOSIS — I10 PRIMARY HYPERTENSION: ICD-10-CM

## 2024-06-26 DIAGNOSIS — E78.5 HYPERLIPIDEMIA, UNSPECIFIED HYPERLIPIDEMIA TYPE: ICD-10-CM

## 2024-06-26 RX ORDER — FUROSEMIDE 40 MG/1
40 TABLET ORAL DAILY
Qty: 90 TABLET | Refills: 3 | Status: SHIPPED | OUTPATIENT
Start: 2024-06-26 | End: 2025-06-26

## 2024-06-26 RX ORDER — AMLODIPINE BESYLATE 10 MG/1
10 TABLET ORAL DAILY
Qty: 90 TABLET | Refills: 3 | Status: SHIPPED | OUTPATIENT
Start: 2024-06-26 | End: 2025-06-26

## 2024-06-26 RX ORDER — LOSARTAN POTASSIUM 100 MG/1
100 TABLET ORAL DAILY
Qty: 90 TABLET | Refills: 3 | Status: SHIPPED | OUTPATIENT
Start: 2024-06-26 | End: 2025-06-26

## 2024-06-26 RX ORDER — ATORVASTATIN CALCIUM 40 MG/1
40 TABLET, FILM COATED ORAL DAILY
Qty: 90 TABLET | Refills: 3 | Status: SHIPPED | OUTPATIENT
Start: 2024-06-26 | End: 2025-06-26

## 2024-06-26 RX ORDER — ATENOLOL 100 MG/1
100 TABLET ORAL DAILY
Qty: 90 TABLET | Refills: 3 | Status: SHIPPED | OUTPATIENT
Start: 2024-06-26 | End: 2025-06-26

## 2024-07-26 ENCOUNTER — OFFICE VISIT (OUTPATIENT)
Dept: GERIATRIC MEDICINE | Facility: CLINIC | Age: 84
End: 2024-07-26
Payer: COMMERCIAL

## 2024-07-26 VITALS
TEMPERATURE: 97.5 F | HEART RATE: 50 BPM | DIASTOLIC BLOOD PRESSURE: 64 MMHG | RESPIRATION RATE: 16 BRPM | SYSTOLIC BLOOD PRESSURE: 116 MMHG

## 2024-07-26 DIAGNOSIS — N18.31 STAGE 3A CHRONIC KIDNEY DISEASE (MULTI): ICD-10-CM

## 2024-07-26 DIAGNOSIS — D64.9 ANEMIA, UNSPECIFIED TYPE: ICD-10-CM

## 2024-07-26 DIAGNOSIS — G47.01 INSOMNIA DUE TO MEDICAL CONDITION: Primary | ICD-10-CM

## 2024-07-26 ASSESSMENT — PAIN SCALES - GENERAL: PAINLEVEL: 0-NO PAIN

## 2024-07-26 NOTE — PROGRESS NOTES
"Reason for visit: follow up home visit    Reason for homebound status: Eva is homebound due to the facts that she is morbidly obese, has excessive urinary incontinence, and has very weak legs, many steps to get out of her house and it would be dangerous for her to attempt to leave the house as it is.     HPI:  Eva was seen today in her home for her routine visit; she says she has been stable once her mouth healed up from the extractions and no new issues, and has not had any other dr appt in the past few months; She did mention she used to have an aide through the [employer] program but there are no aides available so her son-in-law now helps her, which is working out fine other than she is not comfortable with him helping her bathe so she does that independently.     Chief Complaint: \"my legs are very weak\"    Review of Systems   General: feels fine today and every day, no fever or chills, can't fall asleep til about 10p then wakes up at midnight and can't go back to sleep til 3 or 4am, then ends up sleeping many days til 11am which she does not like; feels rested during day; sleeps in recliner, sits in wheelchair most all day, and transfers to potty chair on her own; appetite is good; can't really go anywhere since she says she is so incontinent that she leaks over her diaper and onto the floor often and has to mop up, so would not be able to go on outings like she desires  Skin: no lesions, bruising, pruritus; keeps skin folds clean with wipes but does not shower  Eyes: glasses work ok but scratched and hasn't had eye exam in few years, adequate near and distant vision with no recent vision changes; no eye pain   Ears: no pain or drainage' has mild bilateral hearing loss  Nose and sinuses: no drainage but often feels congested so uses vicks inhaler  Mouth and throat: no oral pain now or sore throat; no lesions or sores; had partial plate molds done but needs to get back for appt; no trouble chewing or " swallowing but notices she chews longer than she needs to; has some teeth in good condition, not loose  Neck: no pain; no decreased ROM; no lumps  Breasts: no lumps or sores, no nipple discharge  Respiratory: no cough, congestion or shortness of breath  Cardiac: no chest pain or palpitations  Gastrointestinal: no abdominal pain, no nausea, vomiting, heartburn, diarrhea or constipation; BM about 2 times a week without straining; no rectal pain or bleeding; no hemorrhoids  Urinary: incontinent; constant leakage; no discomfort with urination; normal color of urine  Reproductive: no bleeding or discharge, no lumps or lesions or rashes  Musculoskeletal: no pain but hips and knees are weak and unstable/shaky; transfers with walker on her own but does not walk; has not fallen in a long time;   Neurologic: long and short term memory intact; no headaches, dizziness or lightheadedness; no tremors or involuntary movements; no altered sensation; no unilateral weakness; right 3rd and 4th fingers numb and cold  Psychiatric: no depression or anxiety; mood is stable    Physical Examination   Vitals:   General: resting in bed, appears stated age, well nourished, appears comfortable  Neurologic: alert, oriented x4 with what appears to be good executive function; speech clear and fluent; cranial nerves 2-12 grossly intact; adequate and equal peripheral strength, tone and sensation  Skin: no abnormalities of hair or nails; no lesions, discolorations, or excessive dryness  Lymph: no palpable or tender lymph glands; no lymphedema  Head: normocephalic, symmetrical features, no evidence of trauma, no tenderness, no masses  Eyes: no ptosis, conjunctival inflammation or scleral icterus; no corneal opacities or abrasions; PERRLA; EOM intact, no exophthalmos; gross visual acuity and visual fields intact  Ears: normal auricles, auditory canals, tympanic membranes; gross hearing intact bilaterally  Nose: no deformities;  no obstruction, mucous  membrane pink w/o bleeding or discharge  Mouth: very mild involuntary movement of mouth; Lips pink, no lesions or abnormal pigmentations; teeth in good overall condition; no inflammation or exudate on gums; mucosa pink and moist w/o coating or lesions, no malodor of breath  Neck: no tenderness; tracheal midline, thyroid nonenlarged and symmetrical  Respiratory: unlabored; chest symmetrical w/ good rise and fall; regular rhythm and depth, breath sounds equal and clear throughout  Breasts: normal size and shape for age, symmetrical, no tenderness or masses  Heart: regular rhythm, rate controlled; bradycardic, 2/6 SM, no rubs and gallops  Abdomen: obese but nondistended, nontenderness, bowel sounds active x4, no masses or hernias; symmetrical  Musculoskeletal: limited joint mobility of right shoulder about 50%, good ROM of left; able to stand independently with some struggle but once standing is steady and stands upright; no tenderness of joints, no effusion, erythema or deformity, no kyphosis or scoliosis; 2+ minimally pitting edema lower legs and feet; feet warm and nondiscolored but pulses not palpable  Psych: pleasant, good attitude, solid [employer]; appropriate responses and affect    No recent labs (last Feb 2024)    Diagnoses and Plan:   Morbid obesity with suspicion of TURNER - asked due to insomnia if she would be interested in home sleep study and she would, also said she would consider a cpap if it would help her sleep; will set up  Primary htn with ckd 3a - atenolol and losartan; HR was 54 but she never had lightheadedness or feels her BP or HR is low; for now keep both meds; explained stage 3a and that there are no changes she needs to make other than telling all prescribers she has ckd and avoiding OTC meds unless reviewing with me first  Chronic diastolic heart failure - only takes lasix and has never had an acute episode  Insomnia - she likes tea so suggested sleepytime tea and 1000mg tylenol at  bedtime. She agrees and will try. Melatonin 5mg did not help  Nerve impingement right hand - was worse few months ago but slowly resolved; could do brace at night but would prefer to work on getting her to sleep better for now and no complicate things since it is better. Told her to let me know if worsens  DM2 diet controlled with no obvious complications - does not check; A1c wnl last time  Will get labs

## 2024-07-27 NOTE — PATIENT INSTRUCTIONS
NP will set up sleep study at home  NP will check into any help for incontinence  Sleepy time tea and tylenol 1000mg at bedtime  Follow up in 3 months  Ok to stop aspirin since no history stroke or coronary artery disease and she rarely remembers to take it

## 2024-07-29 ENCOUNTER — HOME CARE VISIT (OUTPATIENT)
Dept: HOME HEALTH SERVICES | Facility: HOME HEALTH | Age: 84
End: 2024-07-29
Payer: COMMERCIAL

## 2024-07-29 ENCOUNTER — LAB (OUTPATIENT)
Dept: LAB | Facility: LAB | Age: 84
End: 2024-07-29
Payer: COMMERCIAL

## 2024-07-29 DIAGNOSIS — N18.31 STAGE 3A CHRONIC KIDNEY DISEASE (MULTI): ICD-10-CM

## 2024-07-29 DIAGNOSIS — D64.9 ANEMIA, UNSPECIFIED TYPE: ICD-10-CM

## 2024-07-29 DIAGNOSIS — G47.01 INSOMNIA DUE TO MEDICAL CONDITION: ICD-10-CM

## 2024-07-29 LAB
ALBUMIN SERPL BCP-MCNC: 3.5 G/DL (ref 3.4–5)
ALP SERPL-CCNC: 106 U/L (ref 33–136)
ALT SERPL W P-5'-P-CCNC: 13 U/L (ref 7–45)
ANION GAP SERPL CALC-SCNC: 11 MMOL/L (ref 10–20)
AST SERPL W P-5'-P-CCNC: 15 U/L (ref 9–39)
BILIRUB SERPL-MCNC: 0.3 MG/DL (ref 0–1.2)
BUN SERPL-MCNC: 18 MG/DL (ref 6–23)
CALCIUM SERPL-MCNC: 9.1 MG/DL (ref 8.6–10.3)
CHLORIDE SERPL-SCNC: 104 MMOL/L (ref 98–107)
CO2 SERPL-SCNC: 29 MMOL/L (ref 21–32)
CREAT SERPL-MCNC: 1.08 MG/DL (ref 0.5–1.05)
EGFRCR SERPLBLD CKD-EPI 2021: 51 ML/MIN/1.73M*2
ERYTHROCYTE [DISTWIDTH] IN BLOOD BY AUTOMATED COUNT: 16 % (ref 11.5–14.5)
GLUCOSE SERPL-MCNC: 106 MG/DL (ref 74–99)
HCT VFR BLD AUTO: 33.3 % (ref 36–46)
HGB BLD-MCNC: 10.4 G/DL (ref 12–16)
MCH RBC QN AUTO: 25.2 PG (ref 26–34)
MCHC RBC AUTO-ENTMCNC: 31.2 G/DL (ref 32–36)
MCV RBC AUTO: 81 FL (ref 80–100)
NRBC BLD-RTO: 0 /100 WBCS (ref 0–0)
PLATELET # BLD AUTO: 266 X10*3/UL (ref 150–450)
POTASSIUM SERPL-SCNC: 4.3 MMOL/L (ref 3.5–5.3)
PROT SERPL-MCNC: 7.1 G/DL (ref 6.4–8.2)
RBC # BLD AUTO: 4.12 X10*6/UL (ref 4–5.2)
SODIUM SERPL-SCNC: 140 MMOL/L (ref 136–145)
TSH SERPL-ACNC: 1.91 MIU/L (ref 0.44–3.98)
WBC # BLD AUTO: 5.7 X10*3/UL (ref 4.4–11.3)

## 2024-07-29 PROCEDURE — 84443 ASSAY THYROID STIM HORMONE: CPT

## 2024-07-29 PROCEDURE — 80053 COMPREHEN METABOLIC PANEL: CPT

## 2024-07-29 PROCEDURE — 36415 COLL VENOUS BLD VENIPUNCTURE: CPT

## 2024-07-29 PROCEDURE — 85027 COMPLETE CBC AUTOMATED: CPT

## 2024-08-09 ENCOUNTER — PROCEDURE VISIT (OUTPATIENT)
Dept: SLEEP MEDICINE | Facility: HOSPITAL | Age: 84
End: 2024-08-09
Payer: COMMERCIAL

## 2024-08-09 DIAGNOSIS — G47.01 INSOMNIA DUE TO MEDICAL CONDITION: ICD-10-CM

## 2024-08-09 PROCEDURE — 95806 SLEEP STUDY UNATT&RESP EFFT: CPT | Performed by: ALLERGY & IMMUNOLOGY

## 2024-09-30 ENCOUNTER — OFFICE VISIT (OUTPATIENT)
Dept: GERIATRIC MEDICINE | Facility: CLINIC | Age: 84
End: 2024-09-30
Payer: COMMERCIAL

## 2024-09-30 DIAGNOSIS — I10 PRIMARY HYPERTENSION: ICD-10-CM

## 2024-09-30 DIAGNOSIS — N18.31 STAGE 3A CHRONIC KIDNEY DISEASE (MULTI): ICD-10-CM

## 2024-09-30 DIAGNOSIS — G58.9 MONONEUROPATHY: ICD-10-CM

## 2024-09-30 DIAGNOSIS — G47.33 OBSTRUCTIVE SLEEP APNEA OF ADULT: Primary | ICD-10-CM

## 2024-09-30 DIAGNOSIS — I50.32 CHRONIC DIASTOLIC CONGESTIVE HEART FAILURE: ICD-10-CM

## 2024-09-30 DIAGNOSIS — N39.41 URGE INCONTINENCE OF URINE: ICD-10-CM

## 2024-09-30 PROCEDURE — 3077F SYST BP >= 140 MM HG: CPT | Performed by: CLINICAL NURSE SPECIALIST

## 2024-09-30 PROCEDURE — 99497 ADVNCD CARE PLAN 30 MIN: CPT | Performed by: CLINICAL NURSE SPECIALIST

## 2024-09-30 PROCEDURE — 1159F MED LIST DOCD IN RCRD: CPT | Performed by: CLINICAL NURSE SPECIALIST

## 2024-09-30 PROCEDURE — 1157F ADVNC CARE PLAN IN RCRD: CPT | Performed by: CLINICAL NURSE SPECIALIST

## 2024-09-30 PROCEDURE — 3078F DIAST BP <80 MM HG: CPT | Performed by: CLINICAL NURSE SPECIALIST

## 2024-09-30 PROCEDURE — 99350 HOME/RES VST EST HIGH MDM 60: CPT | Performed by: CLINICAL NURSE SPECIALIST

## 2024-09-30 PROCEDURE — 99417 PROLNG OP E/M EACH 15 MIN: CPT | Performed by: CLINICAL NURSE SPECIALIST
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
## 2024-10-01 VITALS
TEMPERATURE: 97.1 F | SYSTOLIC BLOOD PRESSURE: 144 MMHG | HEART RATE: 55 BPM | RESPIRATION RATE: 16 BRPM | DIASTOLIC BLOOD PRESSURE: 74 MMHG

## 2024-10-01 PROBLEM — G47.33 OBSTRUCTIVE SLEEP APNEA OF ADULT: Status: ACTIVE | Noted: 2024-10-01

## 2024-10-01 NOTE — PATIENT INSTRUCTIONS
Cpap should be delivered by [facility]; if you have not heard from them by now, call Krystyna  Start Jardiance; side effect is possible UTI so call with any symptoms of urinary tract infection  Next appointment Oct 30 1pm

## 2024-10-08 ENCOUNTER — TELEPHONE (OUTPATIENT)
Dept: GERIATRIC MEDICINE | Facility: CLINIC | Age: 84
End: 2024-10-08
Payer: COMMERCIAL

## 2024-10-08 DIAGNOSIS — I10 PRIMARY HYPERTENSION: Primary | ICD-10-CM

## 2024-10-08 RX ORDER — AMLODIPINE BESYLATE 5 MG/1
5 TABLET ORAL DAILY
Qty: 90 TABLET | Refills: 3 | Status: SHIPPED | OUTPATIENT
Start: 2024-10-08 | End: 2025-10-08

## 2024-10-09 NOTE — TELEPHONE ENCOUNTER
Eva called today to tell me her Bps. 113/68, 118/60, 105/66 with HR 60. 68, 66. I have to call her back because I did not get to looking at her med list until late but will call tomorrow and tell her to decrease amlodipine to 5mg and check BP daily for another week and call back.

## 2024-11-06 ENCOUNTER — OFFICE VISIT (OUTPATIENT)
Dept: GERIATRIC MEDICINE | Facility: CLINIC | Age: 84
End: 2024-11-06
Payer: COMMERCIAL

## 2024-11-06 DIAGNOSIS — G47.33 OBSTRUCTIVE SLEEP APNEA OF ADULT: ICD-10-CM

## 2024-11-06 DIAGNOSIS — R60.0 BILATERAL LOWER EXTREMITY EDEMA: ICD-10-CM

## 2024-11-06 DIAGNOSIS — N39.41 URGE INCONTINENCE OF URINE: ICD-10-CM

## 2024-11-06 DIAGNOSIS — R26.81 UNSTEADY GAIT: ICD-10-CM

## 2024-11-06 DIAGNOSIS — I50.32 CHRONIC DIASTOLIC CONGESTIVE HEART FAILURE: Primary | ICD-10-CM

## 2024-11-06 DIAGNOSIS — I10 PRIMARY HYPERTENSION: ICD-10-CM

## 2024-11-06 DIAGNOSIS — N18.31 STAGE 3A CHRONIC KIDNEY DISEASE (MULTI): ICD-10-CM

## 2024-11-06 PROCEDURE — 3074F SYST BP LT 130 MM HG: CPT | Performed by: CLINICAL NURSE SPECIALIST

## 2024-11-06 PROCEDURE — 3078F DIAST BP <80 MM HG: CPT | Performed by: CLINICAL NURSE SPECIALIST

## 2024-11-06 PROCEDURE — 99349 HOME/RES VST EST MOD MDM 40: CPT | Performed by: CLINICAL NURSE SPECIALIST

## 2024-11-06 PROCEDURE — 1159F MED LIST DOCD IN RCRD: CPT | Performed by: CLINICAL NURSE SPECIALIST

## 2024-11-06 PROCEDURE — 1157F ADVNC CARE PLAN IN RCRD: CPT | Performed by: CLINICAL NURSE SPECIALIST
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
## 2024-11-07 VITALS
TEMPERATURE: 97 F | HEART RATE: 54 BPM | DIASTOLIC BLOOD PRESSURE: 66 MMHG | RESPIRATION RATE: 20 BRPM | SYSTOLIC BLOOD PRESSURE: 118 MMHG

## 2024-12-17 ENCOUNTER — OFFICE VISIT (OUTPATIENT)
Dept: GERIATRIC MEDICINE | Facility: CLINIC | Age: 84
End: 2024-12-17
Payer: COMMERCIAL

## 2024-12-17 DIAGNOSIS — N18.31 TYPE 2 DIABETES MELLITUS WITH STAGE 3A CHRONIC KIDNEY DISEASE, WITHOUT LONG-TERM CURRENT USE OF INSULIN (MULTI): ICD-10-CM

## 2024-12-17 DIAGNOSIS — I50.32 CHRONIC DIASTOLIC CONGESTIVE HEART FAILURE: Primary | ICD-10-CM

## 2024-12-17 DIAGNOSIS — G47.33 OBSTRUCTIVE SLEEP APNEA OF ADULT: ICD-10-CM

## 2024-12-17 DIAGNOSIS — N18.31 STAGE 3A CHRONIC KIDNEY DISEASE (MULTI): ICD-10-CM

## 2024-12-17 DIAGNOSIS — I10 PRIMARY HYPERTENSION: ICD-10-CM

## 2024-12-17 DIAGNOSIS — E11.22 TYPE 2 DIABETES MELLITUS WITH STAGE 3A CHRONIC KIDNEY DISEASE, WITHOUT LONG-TERM CURRENT USE OF INSULIN (MULTI): ICD-10-CM

## 2024-12-17 PROCEDURE — 3075F SYST BP GE 130 - 139MM HG: CPT | Performed by: CLINICAL NURSE SPECIALIST

## 2024-12-17 PROCEDURE — 3079F DIAST BP 80-89 MM HG: CPT | Performed by: CLINICAL NURSE SPECIALIST

## 2024-12-17 PROCEDURE — 1159F MED LIST DOCD IN RCRD: CPT | Performed by: CLINICAL NURSE SPECIALIST

## 2024-12-17 PROCEDURE — 1157F ADVNC CARE PLAN IN RCRD: CPT | Performed by: CLINICAL NURSE SPECIALIST

## 2024-12-17 PROCEDURE — 99349 HOME/RES VST EST MOD MDM 40: CPT | Performed by: CLINICAL NURSE SPECIALIST

## 2024-12-18 VITALS
RESPIRATION RATE: 16 BRPM | DIASTOLIC BLOOD PRESSURE: 86 MMHG | HEART RATE: 64 BPM | TEMPERATURE: 97.3 F | SYSTOLIC BLOOD PRESSURE: 138 MMHG | WEIGHT: 288 LBS

## 2024-12-18 NOTE — PROGRESS NOTES
"Reason for visit: follow up home visit    Reason for homebound status: Eva is homebound due to the facts that she is morbidly obese, has excessive urinary incontinence, and has very weak legs, many steps to get out of her house and it would be dangerous for her to attempt to leave the house as it is.     HPI:  Eva was seen today in her home to reassess use of cpap as she is nearing the end of the trial period. She spent 2 weeks at [facility] while her daughter was on a cruise.     Chief Complaint: \"I did much better with the cpap at the nursing home\"    Review of Systems   General: feels fine most days, just low energy level. Really can't see the benefits of the cpap but trying to use it every night; no fever or chills, sleeping solid 4-5 hours with cpap and able to fall asleep with it on now;  sleeps in recliner, sits in wheelchair most all day, and transfers to potty chair on her own; appetite is good;   Skin: no lesions, pruritus; keeps skin folds clean with wipes but does not shower; posterior thighs with dry skin  EENT: glasses work ok but scratched and hasn't had eye exam in few years, adequate near and distant vision with no recent vision changes; no eye pain; no pain or drainage' has mild bilateral hearing loss; no sinus drainage but often feels congested so uses vicks inhaler; no oral pain or sore throat; no lesions or sores; has to return 2 more times to dentist to get dentures fitted; no trouble chewing or swallowing but notices she chews longer than she needs to; has some teeth in good condition, not loose; uses eye drops and vaseline in nose at bedtime to help with dryness from the  cpap, helps some  Breasts: no lumps or sores, no nipple discharge  Respiratory: no cough, congestion or shortness of breath  Cardiac: no chest pain or palpitations  Gastrointestinal: no abdominal pain, no nausea, vomiting, heartburn, diarrhea or constipation; BM about 2 times a week without straining; no " rectal pain or bleeding; no hemorrhoids  Urinary: incontinent; constant leakage; no discomfort with urination; normal color of urine; when she stands she notices a lot of urine leaking out  Musculoskeletal: left elbow sore for about a week, no known injury; hips and knees are weak and unstable/shaky; transfers with walker on her own but does not walk; legs always a little swollen, elevated part of day  Neurologic: long and short term memory intact; no headaches, dizziness or lightheadedness; no tremors or involuntary movements; no altered sensation; no unilateral weakness; right 3rd and 4th fingers numb and cold  Psychiatric: no depression or anxiety other than frustration; mood is stable    Physical Examination   Vitals: Blood pressure 138/86, pulse 64, temperature 36.3 °C (97.3 °F), resp. rate 16, weight 131 kg (288 lb).  Pulse ox 95%  General: very pleasant woman sitting in wheelchair in her livingroom; obese but capable of managing around the house on her own; good historian and can answer all questions  Neurologic: alert, oriented x4 with good executive function; speech clear and fluent; facial features and movement of extremities equal; adequate and equal peripheral strength, tone and sensation  Skin: no abnormalities of hair or nails; no lesions, discolorations, or excessive dryness  HEENT: normocephalic, symmetrical features, no evidence of trauma, no ptosis, conjunctival inflammation or scleral icterus; vision and hearing seem adequate for ADLs; no nasal drainage; natural teeth but many missing  Respiratory: unlabored; chest symmetrical w/ good rise and fall; regular rhythm and depth, breath sounds equal and clear throughout  CV: irregular rhythm, rate controlled; 2/6 SM, no rubs and gallops; nonpitting edema lower legs from knees to metatarsals; weak palpable pulses in feet and feet are warm  Abdomen: obese but nondistended, nontenderness, bowel sounds active x4; symmetrical  Musculoskeletal: mild  tenderness of left elbow, no swelling or redness, good ROM; limited joint mobility of right shoulder about 50%, good ROM of left; able to stand independently with some struggle but once standing is steady and stands upright; no tenderness of joints, no effusion, erythema or deformity, no kyphosis or scoliosis; nonpitting edema lower legs and feet; feet warm   Psych: pleasant, good attitude, solid [employer] foundation; appropriate responses and affect    Diagnoses and Plan:   TURNER - doing much better with cpap; explained the hidden benefits for heart and stroke prevention; says she will give it more time since she is tolerating it but expected she might have more energy during the day and hasn't noticed any different feelings  DM2 and Primary htn with ckd 3a - atenolol and losartan; she wont take the jardiance due to risk of uti; will check A1c in January along with renal function  Chronic diastolic heart failure - only takes lasix and has never had an acute episode; mild edema  Complete incontinence of urine - no medications have helped in the past  Insomnia -  may improve with cpap use, somewhat improved  Nerve impingement right hand/mononeuropathy - not of bother much right now  Left elbow pain, acute - seems to be mild OA pain; no acute injury suspected

## 2025-01-01 DIAGNOSIS — I10 PRIMARY HYPERTENSION: ICD-10-CM

## 2025-01-01 DIAGNOSIS — E78.5 HYPERLIPIDEMIA, UNSPECIFIED HYPERLIPIDEMIA TYPE: ICD-10-CM

## 2025-01-01 DIAGNOSIS — I50.32 CHRONIC DIASTOLIC CONGESTIVE HEART FAILURE: ICD-10-CM

## 2025-01-01 RX ORDER — FUROSEMIDE 40 MG/1
40 TABLET ORAL DAILY
Qty: 90 TABLET | Refills: 3 | Status: SHIPPED | OUTPATIENT
Start: 2025-01-01 | End: 2026-01-01

## 2025-01-01 RX ORDER — ATENOLOL 100 MG/1
100 TABLET ORAL DAILY
Qty: 90 TABLET | Refills: 3 | Status: SHIPPED | OUTPATIENT
Start: 2025-01-01 | End: 2026-01-01

## 2025-01-01 RX ORDER — ATORVASTATIN CALCIUM 40 MG/1
40 TABLET, FILM COATED ORAL DAILY
Qty: 90 TABLET | Refills: 3 | Status: SHIPPED | OUTPATIENT
Start: 2025-01-01 | End: 2026-01-01

## 2025-01-01 RX ORDER — LOSARTAN POTASSIUM 100 MG/1
100 TABLET ORAL DAILY
Qty: 90 TABLET | Refills: 3 | Status: SHIPPED | OUTPATIENT
Start: 2025-01-01 | End: 2026-01-01

## 2025-01-23 ENCOUNTER — LAB (OUTPATIENT)
Dept: LAB | Facility: LAB | Age: 85
End: 2025-01-23
Payer: COMMERCIAL

## 2025-01-23 ENCOUNTER — HOME CARE VISIT (OUTPATIENT)
Dept: HOME HEALTH SERVICES | Facility: HOME HEALTH | Age: 85
End: 2025-01-23

## 2025-01-23 DIAGNOSIS — E11.22 TYPE 2 DIABETES MELLITUS WITH STAGE 3A CHRONIC KIDNEY DISEASE, WITHOUT LONG-TERM CURRENT USE OF INSULIN (MULTI): ICD-10-CM

## 2025-01-23 DIAGNOSIS — I50.32 CHRONIC DIASTOLIC CONGESTIVE HEART FAILURE: ICD-10-CM

## 2025-01-23 DIAGNOSIS — N18.31 TYPE 2 DIABETES MELLITUS WITH STAGE 3A CHRONIC KIDNEY DISEASE, WITHOUT LONG-TERM CURRENT USE OF INSULIN (MULTI): ICD-10-CM

## 2025-01-23 LAB
ALBUMIN SERPL BCP-MCNC: 3.2 G/DL (ref 3.4–5)
ALP SERPL-CCNC: 102 U/L (ref 33–136)
ALT SERPL W P-5'-P-CCNC: 11 U/L (ref 7–45)
ANION GAP SERPL CALC-SCNC: 13 MMOL/L (ref 10–20)
AST SERPL W P-5'-P-CCNC: 15 U/L (ref 9–39)
BASOPHILS # BLD AUTO: 0.03 X10*3/UL (ref 0–0.1)
BASOPHILS NFR BLD AUTO: 0.5 %
BILIRUB SERPL-MCNC: 0.4 MG/DL (ref 0–1.2)
BUN SERPL-MCNC: 17 MG/DL (ref 6–23)
CALCIUM SERPL-MCNC: 9.2 MG/DL (ref 8.6–10.3)
CHLORIDE SERPL-SCNC: 107 MMOL/L (ref 98–107)
CO2 SERPL-SCNC: 27 MMOL/L (ref 21–32)
CREAT SERPL-MCNC: 0.95 MG/DL (ref 0.5–1.05)
EGFRCR SERPLBLD CKD-EPI 2021: 59 ML/MIN/1.73M*2
EOSINOPHIL # BLD AUTO: 0.25 X10*3/UL (ref 0–0.4)
EOSINOPHIL NFR BLD AUTO: 4.5 %
ERYTHROCYTE [DISTWIDTH] IN BLOOD BY AUTOMATED COUNT: 14.9 % (ref 11.5–14.5)
GLUCOSE SERPL-MCNC: 94 MG/DL (ref 74–99)
HCT VFR BLD AUTO: 32.9 % (ref 36–46)
HGB BLD-MCNC: 10.4 G/DL (ref 12–16)
IMM GRANULOCYTES # BLD AUTO: 0.01 X10*3/UL (ref 0–0.5)
IMM GRANULOCYTES NFR BLD AUTO: 0.2 % (ref 0–0.9)
LYMPHOCYTES # BLD AUTO: 1.13 X10*3/UL (ref 0.8–3)
LYMPHOCYTES NFR BLD AUTO: 20.5 %
MCH RBC QN AUTO: 25.6 PG (ref 26–34)
MCHC RBC AUTO-ENTMCNC: 31.6 G/DL (ref 32–36)
MCV RBC AUTO: 81 FL (ref 80–100)
MONOCYTES # BLD AUTO: 0.46 X10*3/UL (ref 0.05–0.8)
MONOCYTES NFR BLD AUTO: 8.3 %
NEUTROPHILS # BLD AUTO: 3.63 X10*3/UL (ref 1.6–5.5)
NEUTROPHILS NFR BLD AUTO: 66 %
NRBC BLD-RTO: 0 /100 WBCS (ref 0–0)
PLATELET # BLD AUTO: 252 X10*3/UL (ref 150–450)
POTASSIUM SERPL-SCNC: 4.7 MMOL/L (ref 3.5–5.3)
PROT SERPL-MCNC: 6.6 G/DL (ref 6.4–8.2)
RBC # BLD AUTO: 4.06 X10*6/UL (ref 4–5.2)
SODIUM SERPL-SCNC: 142 MMOL/L (ref 136–145)
WBC # BLD AUTO: 5.5 X10*3/UL (ref 4.4–11.3)

## 2025-01-23 PROCEDURE — 83036 HEMOGLOBIN GLYCOSYLATED A1C: CPT

## 2025-01-23 PROCEDURE — 85025 COMPLETE CBC W/AUTO DIFF WBC: CPT

## 2025-01-23 PROCEDURE — 80053 COMPREHEN METABOLIC PANEL: CPT

## 2025-01-24 LAB
EST. AVERAGE GLUCOSE BLD GHB EST-MCNC: 143 MG/DL
HBA1C MFR BLD: 6.6 %

## 2025-02-04 ENCOUNTER — OFFICE VISIT (OUTPATIENT)
Dept: GERIATRIC MEDICINE | Facility: CLINIC | Age: 85
End: 2025-02-04
Payer: COMMERCIAL

## 2025-02-04 VITALS
HEART RATE: 58 BPM | SYSTOLIC BLOOD PRESSURE: 142 MMHG | DIASTOLIC BLOOD PRESSURE: 84 MMHG | RESPIRATION RATE: 16 BRPM | TEMPERATURE: 96.9 F

## 2025-02-04 DIAGNOSIS — I50.32 CHRONIC DIASTOLIC CONGESTIVE HEART FAILURE: ICD-10-CM

## 2025-02-04 DIAGNOSIS — I10 PRIMARY HYPERTENSION: ICD-10-CM

## 2025-02-04 DIAGNOSIS — G47.33 OBSTRUCTIVE SLEEP APNEA OF ADULT: Primary | ICD-10-CM

## 2025-02-04 DIAGNOSIS — M62.81 GENERALIZED MUSCLE WEAKNESS: ICD-10-CM

## 2025-02-04 DIAGNOSIS — N39.41 URGE INCONTINENCE OF URINE: ICD-10-CM

## 2025-02-04 DIAGNOSIS — N18.31 STAGE 3A CHRONIC KIDNEY DISEASE (MULTI): ICD-10-CM

## 2025-02-04 PROCEDURE — 1157F ADVNC CARE PLAN IN RCRD: CPT | Performed by: CLINICAL NURSE SPECIALIST

## 2025-02-04 PROCEDURE — 1036F TOBACCO NON-USER: CPT | Performed by: CLINICAL NURSE SPECIALIST

## 2025-02-04 PROCEDURE — 3079F DIAST BP 80-89 MM HG: CPT | Performed by: CLINICAL NURSE SPECIALIST

## 2025-02-04 PROCEDURE — 99348 HOME/RES VST EST LOW MDM 30: CPT | Performed by: CLINICAL NURSE SPECIALIST

## 2025-02-04 PROCEDURE — 1159F MED LIST DOCD IN RCRD: CPT | Performed by: CLINICAL NURSE SPECIALIST

## 2025-02-04 PROCEDURE — 3077F SYST BP >= 140 MM HG: CPT | Performed by: CLINICAL NURSE SPECIALIST

## 2025-02-04 NOTE — PROGRESS NOTES
"Reason for visit: follow up home visit    Reason for homebound status: Eva is homebound due to the facts that she is morbidly obese, has excessive urinary incontinence, and has very weak legs, many steps to get out of her house and it would be dangerous for her to attempt to leave the house as it is.     HPI:  Eva was seen today in her home for a routine check up. After being in the nursing home for a few weeks and getting more used to the cpap, she is now wearing it 4-6 hours every night and decided to keep it. She had labs in December and those were reviewed last week over the phone.     Chief Complaint: \"I am doing ok, nothing new\"    Review of Systems   General: feels fine most days, just low energy level. No recent illness and no fever or chills, sleeping solid 4-5 hours with cpap and one night slept 8hr;  sleeps in recliner, sits in wheelchair most all day, and transfers to potty chair on her own; appetite is good;   Skin: no lesions, pruritus; keeps skin folds clean with wipes but does not shower; posterior thighs with dry skin  EENT: glasses work ok but scratched and hasn't had eye exam in few years, adequate near and distant vision with no recent vision changes; no eye pain; no pain or drainage' has mild bilateral hearing loss; no sinus drainage but often feels congested so uses vicks inhaler; no oral pain or sore throat; no lesions or sores; dental appointment this Friday but may have to cancel due to cold weather; no trouble chewing or swallowing but notices she chews longer than she needs to; has some teeth in good condition, not loose; uses eye drops and vaseline in nose at bedtime to help with dryness from the  cpap, helps some  Breasts: no lumps or sores, no nipple discharge  Respiratory: no cough, congestion or shortness of breath  Cardiac: no chest pain or palpitations  Gastrointestinal: no abdominal pain, no nausea, vomiting, heartburn, diarrhea or constipation; BM every few days without " straining; no rectal pain or bleeding; no hemorrhoids  Urinary: incontinent; constant leakage; no discomfort with urination; normal color of urine; when she stands she notices a lot of urine leaking out  Musculoskeletal: hands and elbows typically sore; hips and knees are weak and unstable/shaky; transfers with walker on her own but does not walk; legs always a little swollen, right leg rotates outward  Neurologic: long and short term memory intact; no headaches, dizziness or lightheadedness; no tremors or involuntary movements; no unilateral weakness; intermittent cold numb feeling in both hands  Psychiatric: no depression or anxiety other than frustration; mood is stable    Lab Results   Component Value Date    WBC 5.5 01/23/2025    HGB 10.4 (L) 01/23/2025    HCT 32.9 (L) 01/23/2025    MCV 81 01/23/2025     01/23/2025       Chemistry    Lab Results   Component Value Date/Time     01/23/2025 1333    K 4.7 01/23/2025 1333     01/23/2025 1333    CO2 27 01/23/2025 1333    BUN 17 01/23/2025 1333    CREATININE 0.95 01/23/2025 1333    Lab Results   Component Value Date/Time    CALCIUM 9.2 01/23/2025 1333    ALKPHOS 102 01/23/2025 1333    AST 15 01/23/2025 1333    ALT 11 01/23/2025 1333    BILITOT 0.4 01/23/2025 1333        Lab Results   Component Value Date    HGBA1C 6.6 (H) 01/23/2025       Physical Examination   Vitals: Blood pressure 142/84, pulse 58, temperature 36.1 °C (96.9 °F), resp. rate 16.  Pulse ox 95%  General: very pleasant woman sitting in wheelchair in her livingroom; obese but capable of managing around the house on her own; good historian and can answer all questions  Neurologic: alert, oriented x4 with good executive function; speech clear and fluent; facial features and movement of extremities equal; adequate and equal peripheral strength, tone and sensation  Skin: no abnormalities of hair or nails; no lesions, discolorations, or excessive dryness; posterior thighs very wrinkly and  mildly dry  HEENT: normocephalic, symmetrical features, no evidence of trauma, no ptosis, conjunctival inflammation or scleral icterus; vision and hearing seem adequate for ADLs; no nasal drainage; natural teeth but many missing  Respiratory: unlabored; chest symmetrical w/ good rise and fall; regular rhythm and depth, breath sounds equal and clear throughout  CV: regular rhythm today, rate controlled; 2/6 SM, no rubs and gallops; nonpitting edema lower legs from knees to toes; weak palpable pulses in feet and feet are warm  Abdomen: obese but nondistended, nontenderness, bowel sounds active x4; symmetrical  Musculoskeletal: no tenderness of joints, no swelling or redness, overall mild decreased ROM but limited joint mobility of right shoulder about 50%, good ROM of left; able to stand independently with some struggle but once standing is steady and stands upright; no tenderness of joints, no effusion, erythema or deformity, no kyphosis or scoliosis; nonpitting edema lower legs and feet; feet warm   Psych: pleasant, good attitude, solid Mandaen foundation; appropriate responses and affect    Reviewed labs again in person    Diagnoses and Plan:   TURNER - doing much better with cpap; dry mouth and nasal passages is biggest complaint  DM2 and Primary htn with ckd 3a - atenolol and losartan; she wont take the jardiance due to risk of uti; A1c good and gfr 59 which improved a bit; caution with any new medications and she keeps hydrated  Chronic diastolic heart failure - only takes lasix and has never had an acute episode; mild edema, a little worse but keeps legs on floor all day  Complete incontinence of urine - no medications have helped in the past; uses a lot of incontinence supplies  Insomnia -   improved with cpap use  Nerve impingement right hand/mononeuropathy - actually moves from right arm to left she says, both bother her intermittently; no treatment; offered PT and OT for help with arms and weakness in legs  but she declined

## 2025-02-05 NOTE — PATIENT INSTRUCTIONS
No changes  Follow up in 3 months  Call office if any problems or if needed seen earlier  Keep up the good work!

## 2025-05-12 ENCOUNTER — OFFICE VISIT (OUTPATIENT)
Dept: GERIATRIC MEDICINE | Facility: CLINIC | Age: 85
End: 2025-05-12
Payer: COMMERCIAL

## 2025-05-12 VITALS
DIASTOLIC BLOOD PRESSURE: 80 MMHG | HEART RATE: 65 BPM | RESPIRATION RATE: 16 BRPM | SYSTOLIC BLOOD PRESSURE: 130 MMHG | TEMPERATURE: 97.8 F

## 2025-05-12 DIAGNOSIS — I50.32 CHRONIC DIASTOLIC CONGESTIVE HEART FAILURE: Primary | ICD-10-CM

## 2025-05-12 DIAGNOSIS — N18.31 STAGE 3A CHRONIC KIDNEY DISEASE (MULTI): ICD-10-CM

## 2025-05-12 DIAGNOSIS — F51.05 INSOMNIA SECONDARY TO DEPRESSION WITH ANXIETY: ICD-10-CM

## 2025-05-12 DIAGNOSIS — N18.31 TYPE 2 DIABETES MELLITUS WITH STAGE 3A CHRONIC KIDNEY DISEASE, WITHOUT LONG-TERM CURRENT USE OF INSULIN (MULTI): ICD-10-CM

## 2025-05-12 DIAGNOSIS — G47.33 OBSTRUCTIVE SLEEP APNEA OF ADULT: ICD-10-CM

## 2025-05-12 DIAGNOSIS — H93.8X3 EAR CONGESTION, BILATERAL: ICD-10-CM

## 2025-05-12 DIAGNOSIS — F41.8 INSOMNIA SECONDARY TO DEPRESSION WITH ANXIETY: ICD-10-CM

## 2025-05-12 DIAGNOSIS — E11.22 TYPE 2 DIABETES MELLITUS WITH STAGE 3A CHRONIC KIDNEY DISEASE, WITHOUT LONG-TERM CURRENT USE OF INSULIN (MULTI): ICD-10-CM

## 2025-05-12 PROBLEM — N39.45 CONTINUOUS LEAKAGE OF URINE: Status: ACTIVE | Noted: 2025-05-12

## 2025-05-12 PROCEDURE — 1036F TOBACCO NON-USER: CPT | Performed by: CLINICAL NURSE SPECIALIST

## 2025-05-12 PROCEDURE — 99349 HOME/RES VST EST MOD MDM 40: CPT | Performed by: CLINICAL NURSE SPECIALIST

## 2025-05-12 PROCEDURE — 1159F MED LIST DOCD IN RCRD: CPT | Performed by: CLINICAL NURSE SPECIALIST

## 2025-05-12 PROCEDURE — 3075F SYST BP GE 130 - 139MM HG: CPT | Performed by: CLINICAL NURSE SPECIALIST

## 2025-05-12 PROCEDURE — G2211 COMPLEX E/M VISIT ADD ON: HCPCS | Performed by: CLINICAL NURSE SPECIALIST

## 2025-05-12 PROCEDURE — 3079F DIAST BP 80-89 MM HG: CPT | Performed by: CLINICAL NURSE SPECIALIST

## 2025-05-12 RX ORDER — FLUTICASONE PROPIONATE 50 MCG
1 SPRAY, SUSPENSION (ML) NASAL DAILY
Qty: 16 G | Refills: 5 | Status: SHIPPED | OUTPATIENT
Start: 2025-05-12 | End: 2026-05-12

## 2025-05-12 RX ORDER — TRAZODONE HYDROCHLORIDE 50 MG/1
50 TABLET ORAL NIGHTLY
Qty: 90 TABLET | Refills: 3 | Status: SHIPPED | OUTPATIENT
Start: 2025-05-12 | End: 2026-05-12

## 2025-05-12 NOTE — PROGRESS NOTES
"Reason for visit: follow up home visit    Reason for homebound status: Eva is homebound due to the facts that she is morbidly obese, has excessive urinary incontinence, and has very weak legs, many steps to get out of her house and it would be dangerous for her to attempt to leave the house as it is.     HPI:  Eva was seen today in her home for a routine check up. Since seen last she was able to get out for her final dental appt and get her dentures.  Of course, it was a rough day with her having trouble getting transportation back home but it worked out.     Chief Complaint: \"I'm about the same. My ears feel a little clogged and I can't hear conversations sometimes\"    Review of Systems   General: feels fine, just low energy level and has been feeling \"down\". No recent illness and no fever or chills, sleeping solid anywhere between 6 and 8 hrs, usually 6, but has trouble falling asleep as she says she can't get things off her mind; sleeps in recliner, sits in wheelchair all day, and transfers to potty chair on her own; appetite is good  Skin: no lesions, pruritus; keeps skin folds clean with wipes but does not shower; posterior thighs with dry skin and that area stings  EENT: glasses work ok; adequate near and distant vision with no recent vision changes; no eye pain; no pain or drainage' has mild bilateral hearing loss, ears feel clogged but no pain; no sinus drainage but often feels congested so uses vicks inhaler; no oral pain or sore throat; no lesions or sores; no trouble chewing or swallowing w/o dentures so hasn't really worn them much but they fit ok; still some teeth in good condition, not loose  Respiratory: no cough, congestion or shortness of breath  Cardiac: no chest pain or palpitations  Gastrointestinal: no abdominal pain, no nausea, vomiting, heartburn, diarrhea or constipation; BM every couple days without straining; no rectal pain or bleeding; no hemorrhoids  Urinary: incontinent; constant " leakage; no discomfort with urination; normal color of urine; when she stands she notices a lot of urine leaking out  Musculoskeletal: hands and elbows typically sore; hips and knees are weak and unstable/shaky; transfers with walker on her own but does not walk; legs always a little swollen, right leg rotates outward  Neurologic: long and short term memory intact; no headaches, dizziness or lightheadedness; no tremors or involuntary movements; no unilateral weakness; intermittent cold numb feeling in both hands  Psychiatric: admits to dwelling on the past and family not being receptive to her concerns; told me about her childhood and her marriage at age 16 and 6 children at a young age, alcoholic . Says she just can't let it go and it bothers her all the time; all her children have had and some still have bad addiction problems. She does not want to do counseling. She used to take fluoxetine but stopped it a long time ago. Agrees something may help but she's not sure she wants to take another pill.    Physical Examination   Vitals: Blood pressure 130/80, pulse 65, temperature 36.6 °C (97.8 °F), resp. rate 16.  Pulse ox 92%  General: very pleasant woman sitting in wheelchair in her livingroom; obese but capable of managing around the house on her own; good historian and can answer all questions  Neurologic: alert, oriented x4 with good executive function; speech clear and fluent; facial features and movement of extremities equal; adequate and equal peripheral strength, tone and sensation  Skin: no abnormalities of hair or nails; no lesions, discolorations, or excessive dryness other than posterior thighs very wrinkly, ashen, and dry  HEENT: normocephalic, symmetrical features, no evidence of trauma, no ptosis, conjunctival inflammation or scleral icterus; vision and hearing seem adequate for ADLs; no nasal drainage; natural teeth but many missing; does not have dentures in; did not assess ear canals due to no  otoscope with me today, but recently she had same c/o and ears were clean  Respiratory: unlabored; chest symmetrical w/ good rise and fall; regular rhythm and depth, breath sounds equal and clear throughout  CV: regular rhythm today, rate controlled; 2/6 SM, no rubs and gallops; nonpitting edema lower legs from shins to toes; weak palpable pulses in feet and feet are warm  Abdomen: obese but nondistended, nontenderness, bowel sounds active x4; symmetrical  Musculoskeletal: no tenderness of joints, no swelling or redness, overall mild decreased ROM but limited joint mobility of right shoulder about 50%, good ROM of left; able to stand independently with some struggle but once standing is steady and stands upright; no tenderness of joints, no effusion, erythema or deformity, no kyphosis or scoliosis; nonpitting edema lower legs and feet; feet warm   Psych: pleasant, good attitude, solid [employer] foundation; appropriate responses and affect, but most definitely seems to have depression related to past that family is not receptive to    Diagnoses and Plan:   Chronic diastolic heart failure - only takes lasix and has never had an acute episode; mild edema, a little worse but keeps legs on floor all day    DM2 and Primary htn with ckd 3a - atenolol and losartan; she wont take the jardiance due to risk of uti; A1c good and gfr 59 which improved a bit; caution with any new medications and she keeps hydrated    Complete incontinence of urine - no medications have helped in the past; uses a lot of incontinence supplies; offered purewick again as insurance may now pay. Will send Rx and see what happens    Insomnia associated with depression/anxiety -   insomnia thought to be improved with cpap use but it was taken back due to not using it enough hours per night; she now realizes it didn't help because her daytime energy is no better and she is not sleeping any different, maybe a little better due to not having to deal with  it; discussed depression and anxiety and not being able to wind down her thoughts at night along with her depression. Suggested trying trazodone, she agrees to try. Also suggested counseling but she is not interested but says she may start spending more time with her group at the [facility].    Nerve impingement both hands/mononeuropathy - actually moves from right arm to left she says, both bother her intermittently; no treatment; offered PT and OT previously but she declined    TURNER - not able to treat since she wasn't compliant enough with the cpap.     Sinus congestion and ear congestion - try flonase daily to see if that opens sinuses and eustachian tubes; may help some with hearing; she would like to have the hearing specialist come to eval for hearing aides if insurance pays

## 2025-05-13 DIAGNOSIS — H93.8X3 EAR CONGESTION, BILATERAL: ICD-10-CM

## 2025-05-13 RX ORDER — FLUTICASONE PROPIONATE 50 MCG
1 SPRAY, SUSPENSION (ML) NASAL DAILY
Qty: 16 G | Refills: 5 | OUTPATIENT
Start: 2025-05-13 | End: 2026-05-13

## 2025-05-13 NOTE — PATIENT INSTRUCTIONS
Trazodone at bedtime for sleep and depression; call if it does not help within 1 week  Mpck ordered, may take a while; you will know the cost before it is finalized  Follow up visit in 2 months

## 2025-05-16 RX ORDER — FLUTICASONE PROPIONATE 50 MCG
SPRAY, SUSPENSION (ML) NASAL
Qty: 48 G | OUTPATIENT
Start: 2025-05-16

## 2025-07-02 ENCOUNTER — OFFICE VISIT (OUTPATIENT)
Dept: GERIATRIC MEDICINE | Facility: CLINIC | Age: 85
End: 2025-07-02
Payer: COMMERCIAL

## 2025-07-02 DIAGNOSIS — N18.31 STAGE 3A CHRONIC KIDNEY DISEASE (MULTI): ICD-10-CM

## 2025-07-02 DIAGNOSIS — I50.32 CHRONIC DIASTOLIC CONGESTIVE HEART FAILURE: ICD-10-CM

## 2025-07-02 DIAGNOSIS — M62.81 GENERALIZED MUSCLE WEAKNESS: ICD-10-CM

## 2025-07-02 DIAGNOSIS — M19.90 ARTHRITIS: Primary | ICD-10-CM

## 2025-07-02 DIAGNOSIS — M25.551 HIP PAIN, RIGHT: ICD-10-CM

## 2025-07-03 VITALS
HEART RATE: 62 BPM | DIASTOLIC BLOOD PRESSURE: 90 MMHG | TEMPERATURE: 97.5 F | SYSTOLIC BLOOD PRESSURE: 160 MMHG | RESPIRATION RATE: 20 BRPM

## 2025-07-04 NOTE — PROGRESS NOTES
"Reason for visit: follow up home visit    Reason for homebound status: Eva is homebound due to the facts that she is morbidly obese, has excessive urinary incontinence, and has very weak legs, many steps to get out of her house and it would be dangerous for her to attempt to leave the house as it is.     HPI:  Eva was seen today in her home for a routine check up. Since seen last she has not had any issues. She was seen by her insurance NP who ordered an gel mattress pad but the insurance would not cover it due to not having a pressure sore.     Chief Complaint: \"something is wrong. I think it was that I had a lot of trouble ordering my groceries from [employer]\"    Review of Systems   General: feels ok, but says she has had a rough month with something just not right. Lengthy explanation about how she ordered groceries, some did not get delivered, and she is not sure where the order is. She had trouble explaining it. No AC in her home. Says she does not get that hot and she drinks adequately. No recent illness and no fever or chills, sleeping solid for few hours and wakes up few times;  has trouble falling asleep as she says she can't get things off her mind; sleeps in recliner, sits in wheelchair all day, and transfers to potty chair on her own; appetite is good and had plenty of food; thinks she took her meds most days but may have missed a few days  Skin: no lesions, pruritus; keeps skin folds clean with wipes but does not shower; posterior thighs with dry skin and that area stings  EENT: glasses work ok; adequate near and distant vision with no recent vision changes; no eye pain; no pain or drainage' has mild bilateral hearing loss, but just got hearing aids but upset that she may have to pay a lot for them; no sinus drainage but often feels congested so uses vicks inhaler; no oral pain or sore throat; no lesions or sores; no trouble chewing or swallowing w/o dentures so hasn't really worn them and feels " "she has \"tartar\" on her teeth that when she can get ahold of it that if she would pull it off it is so sticky that it seems she will pull off skin; has tried mouth moisturizer but it does not work.   Respiratory: no cough, congestion or shortness of breath  Cardiac: no chest pain or palpitations  Gastrointestinal: no abdominal pain, no nausea, vomiting, heartburn, diarrhea or constipation; BM every couple days without straining; no rectal pain or bleeding; no hemorrhoids  Urinary: incontinent; constant leakage; no discomfort with urination; normal color of urine; when she stands she notices a lot of urine leaking out  Musculoskeletal: hands and elbows typically sore; hips and knees are weak and unstable/shaky; transfers with walker on her own but does not walk; legs always a little swollen, right leg rotates outward  Neurologic: long and short term memory intact; no headaches, dizziness or lightheadedness; no tremors or involuntary movements; no unilateral weakness; intermittent cold numb feeling in both hands  Psychiatric: admits to dwelling on the past and family not being receptive to her concerns; son in law is supposed to be helping her since he gets paid but she does not like to bother him    Physical Examination   Vitals: Blood pressure 160/90, pulse 62, temperature 36.4 °C (97.5 °F), resp. rate 20.  Pulse ox 90%  General: very pleasant woman sitting in transport wheelchair in her livingroom; puddle of urine on the floor under her; she seems  a little off today with her statements not being as fluent as usual and having trouble explaining herself.   Neurologic: alert, oriented x4 with good executive function; speech clear and fluent but hesitates at times, some trouble expressing complete thoughts; facial features and movement of extremities equal; adequate and equal peripheral strength, tone and sensation  Skin: no abnormalities of hair or nails; no lesions, discolorations, or excessive dryness other than " posterior thighs very wrinkly, ashen, and dry  HEENT: normocephalic, symmetrical features, no evidence of trauma, no ptosis, conjunctival inflammation or scleral icterus; vision and hearing seem adequate for ADLs; no nasal drainage; natural teeth but many missing; does not have dentures or hearing aides in  Respiratory: unlabored; chest symmetrical w/ good rise and fall; regular rhythm and depth, breath sounds equal and clear throughout  CV: regular rhythm today, rate controlled; 3/6 SM, no rubs and gallops; nonpitting edema lower legs from shins to toes; weak palpable pulses in feet and feet are warm  Abdomen: obese but nondistended, nontenderness, bowel sounds active x4; symmetrical  Musculoskeletal: no tenderness of joints, no swelling or redness, overall mild decreased ROM but limited joint mobility of right shoulder about 50%, good ROM of left; able to stand independently with some struggle but once standing is steady and stands upright; no tenderness of joints, no effusion, erythema or deformity, no kyphosis or scoliosis; 1+ edema lower legs and feet that seems a little worse than usual; feet warm   Psych: pleasant, seems down, seems like she is not getting the attention and help she needs.     Diagnoses and Plan:     Eva seemed a little off today. I questioned if she felt she was a little dehydrated but she said she is drinking a decent amount and making sure not just water. Has had some of the IV hydration drinks, regular water, and some sugar free soda. She insisted she was physically ok and that it was just that the ordering of her [employer] order on the computer stressed her out for the past few weeks. I will call and check on her tomorrow.     Chronic diastolic heart failure - only takes lasix and has never had an acute episode; mild edema, a little worse but keeps legs on floor all day; lungs clear. Seems controlled. I would not stop the lasix though it makes it hard to manage chf when she is not in  AC and the past 2+ weeks have been excessively hot day and night.     DM2 and Primary htn with ckd 3a - atenolol and losartan; she wont take the jardiance due to risk of uti; A1c good and gfr 59 which improved a bit; caution with any new medications and she keeps hydrated    Complete incontinence of urine - no medications have helped in the past; uses a lot of incontinence supplies; purewick was not covered. Her family may need to help her more. I will order PT and OT and maybe nursing will be able to send an aide a few days a week.     Insomnia associated with depression/anxiety -   taking trazodone at hs, would assume not high enough dose to help with depression; maybe will be helpful to have therapy and nursing come in for emotional support as well.     Nerve impingement both hands/mononeuropathy - actually moves from right arm to left she says, both bother her intermittently; will start pt and ot    TURNER - not able to treat since she wasn't compliant enough with the cpap.     Sinus congestion and ear congestion - has hearing aides; used flonase but can't say if it helped     Will order a new transport chair as the one she has she bought years ago and the tires are worn and she has trouble maneuvering it. She needs it to get around the house for ADLs since she is not able to walk at all.

## 2025-07-05 ENCOUNTER — HOME HEALTH ADMISSION (OUTPATIENT)
Dept: HOME HEALTH SERVICES | Facility: HOME HEALTH | Age: 85
End: 2025-07-05
Payer: COMMERCIAL

## 2025-07-08 ENCOUNTER — HOME CARE VISIT (OUTPATIENT)
Dept: HOME HEALTH SERVICES | Facility: HOME HEALTH | Age: 85
End: 2025-07-08

## 2025-07-09 LAB
ALBUMIN SERPL-MCNC: 3.6 G/DL (ref 3.6–5.1)
ALP SERPL-CCNC: 108 U/L (ref 37–153)
ALT SERPL-CCNC: 14 U/L (ref 6–29)
ANION GAP SERPL CALCULATED.4IONS-SCNC: 12 MMOL/L (CALC) (ref 7–17)
AST SERPL-CCNC: 20 U/L (ref 10–35)
BASOPHILS # BLD AUTO: 21 CELLS/UL (ref 0–200)
BASOPHILS NFR BLD AUTO: 0.4 %
BILIRUB SERPL-MCNC: 0.4 MG/DL (ref 0.2–1.2)
BUN SERPL-MCNC: 13 MG/DL (ref 7–25)
CALCIUM SERPL-MCNC: 9.3 MG/DL (ref 8.6–10.4)
CHLORIDE SERPL-SCNC: 102 MMOL/L (ref 98–110)
CO2 SERPL-SCNC: 27 MMOL/L (ref 20–32)
CREAT SERPL-MCNC: 0.96 MG/DL (ref 0.6–0.95)
EGFRCR SERPLBLD CKD-EPI 2021: 58 ML/MIN/1.73M2
EOSINOPHIL # BLD AUTO: 254 CELLS/UL (ref 15–500)
EOSINOPHIL NFR BLD AUTO: 4.8 %
ERYTHROCYTE [DISTWIDTH] IN BLOOD BY AUTOMATED COUNT: 15.1 % (ref 11–15)
GLUCOSE SERPL-MCNC: 76 MG/DL (ref 65–99)
HCT VFR BLD AUTO: 34.9 % (ref 35–45)
HGB BLD-MCNC: 10.9 G/DL (ref 11.7–15.5)
LYMPHOCYTES # BLD AUTO: 1060 CELLS/UL (ref 850–3900)
LYMPHOCYTES NFR BLD AUTO: 20 %
MCH RBC QN AUTO: 26.1 PG (ref 27–33)
MCHC RBC AUTO-ENTMCNC: 31.2 G/DL (ref 32–36)
MCV RBC AUTO: 83.5 FL (ref 80–100)
MONOCYTES # BLD AUTO: 482 CELLS/UL (ref 200–950)
MONOCYTES NFR BLD AUTO: 9.1 %
NEUTROPHILS # BLD AUTO: 3482 CELLS/UL (ref 1500–7800)
NEUTROPHILS NFR BLD AUTO: 65.7 %
PLATELET # BLD AUTO: 272 THOUSAND/UL (ref 140–400)
PMV BLD REES-ECKER: 10.2 FL (ref 7.5–12.5)
POTASSIUM SERPL-SCNC: 4.6 MMOL/L (ref 3.5–5.3)
PROT SERPL-MCNC: 7.2 G/DL (ref 6.1–8.1)
RBC # BLD AUTO: 4.18 MILLION/UL (ref 3.8–5.1)
SODIUM SERPL-SCNC: 141 MMOL/L (ref 135–146)
WBC # BLD AUTO: 5.3 THOUSAND/UL (ref 3.8–10.8)

## 2025-07-10 ENCOUNTER — HOME CARE VISIT (OUTPATIENT)
Dept: HOME HEALTH SERVICES | Facility: HOME HEALTH | Age: 85
End: 2025-07-10
Payer: COMMERCIAL

## 2025-07-10 PROCEDURE — G0299 HHS/HOSPICE OF RN EA 15 MIN: HCPCS

## 2025-07-11 VITALS
HEART RATE: 61 BPM | SYSTOLIC BLOOD PRESSURE: 165 MMHG | TEMPERATURE: 97.1 F | RESPIRATION RATE: 16 BRPM | OXYGEN SATURATION: 97 % | DIASTOLIC BLOOD PRESSURE: 90 MMHG

## 2025-07-11 ASSESSMENT — ENCOUNTER SYMPTOMS
DYSPNEA ACTIVITY LEVEL: AT REST
HEADACHES: 1
SHORTNESS OF BREATH: 1
CHANGE IN APPETITE: UNCHANGED
LAST BOWEL MOVEMENT: 67395
APPETITE LEVEL: GOOD

## 2025-07-11 ASSESSMENT — ACTIVITIES OF DAILY LIVING (ADL): ENTERING_EXITING_HOME: MODERATE ASSIST

## 2025-07-12 ASSESSMENT — ENCOUNTER SYMPTOMS
MUSCLE WEAKNESS: 1
LIMITED RANGE OF MOTION: 1
DENIES PAIN: 1
PERSON REPORTING PAIN: PATIENT
SUBJECTIVE PAIN PROGRESSION: UNCHANGED
PAIN SEVERITY GOAL: 0/10

## 2025-07-12 ASSESSMENT — ACTIVITIES OF DAILY LIVING (ADL)
OASIS_M1830: 05
CURRENT_FUNCTION: ONE PERSON
AMBULATION ASSISTANCE: ONE PERSON

## 2025-07-15 ENCOUNTER — HOME CARE VISIT (OUTPATIENT)
Dept: HOME HEALTH SERVICES | Facility: HOME HEALTH | Age: 85
End: 2025-07-15
Payer: COMMERCIAL

## 2025-07-15 VITALS
RESPIRATION RATE: 18 BRPM | HEIGHT: 61 IN | TEMPERATURE: 98.6 F | WEIGHT: 280 LBS | BODY MASS INDEX: 52.87 KG/M2 | OXYGEN SATURATION: 98 % | HEART RATE: 72 BPM

## 2025-07-15 PROCEDURE — G0151 HHCP-SERV OF PT,EA 15 MIN: HCPCS

## 2025-07-15 ASSESSMENT — PAIN SCALES - PAIN ASSESSMENT IN ADVANCED DEMENTIA (PAINAD)
FACIALEXPRESSION: 2
CONSOLABILITY: 1
FACIALEXPRESSION: 2 - FACIAL GRIMACING.
NEGVOCALIZATION: 1 - OCCASIONAL MOAN OR GROAN. LOW-LEVEL SPEECH WITH A NEGATIVE OR DISAPPROVING QUALITY.
NEGVOCALIZATION: 1
BODYLANGUAGE: 0
BODYLANGUAGE: 0 - RELAXED.
BREATHING: 1
TOTALSCORE: 5
CONSOLABILITY: 1 - DISTRACTED OR REASSURED BY VOICE OR TOUCH.

## 2025-07-15 ASSESSMENT — ENCOUNTER SYMPTOMS
PAIN SEVERITY GOAL: 0/10
PERSON REPORTING PAIN: PATIENT
LOWEST PAIN SEVERITY IN PAST 24 HOURS: 1/10
PAIN LOCATION: GENERALIZED
PAIN LOCATION - RELIEVING FACTORS: REST,
SUBJECTIVE PAIN PROGRESSION: WAXING AND WANING
PAIN LOCATION - PAIN FREQUENCY: INTERMITTENT
PAIN LOCATION - PAIN QUALITY: SHOULDERS
PAIN LOCATION - EXACERBATING FACTORS: ACTIVITY
HIGHEST PAIN SEVERITY IN PAST 24 HOURS: 3/10
PAIN: 1

## 2025-07-15 ASSESSMENT — ACTIVITIES OF DAILY LIVING (ADL): AMBULATION ASSISTANCE ON FLAT SURFACES: 1

## 2025-07-16 ENCOUNTER — HOME CARE VISIT (OUTPATIENT)
Dept: HOME HEALTH SERVICES | Facility: HOME HEALTH | Age: 85
End: 2025-07-16
Payer: COMMERCIAL

## 2025-07-16 VITALS
OXYGEN SATURATION: 96 % | HEART RATE: 62 BPM | DIASTOLIC BLOOD PRESSURE: 72 MMHG | SYSTOLIC BLOOD PRESSURE: 142 MMHG | TEMPERATURE: 97.2 F

## 2025-07-16 PROCEDURE — G0152 HHCP-SERV OF OT,EA 15 MIN: HCPCS

## 2025-07-16 ASSESSMENT — ACTIVITIES OF DAILY LIVING (ADL)
DRESSING_UB_CURRENT_FUNCTION: INDEPENDENT
TOILETING: INDEPENDENT
TOILETING: 1
BATHING ASSESSED: 1
FEEDING ASSESSED: 1
ORAL_CARE_ASSESSED: 1
BATHING_CURRENT_FUNCTION: MAXIMUM ASSIST
ORAL_CARE_CURRENT_FUNCTION: INDEPENDENT
DRESSING_LB_CURRENT_FUNCTION: INDEPENDENT
FEEDING: INDEPENDENT

## 2025-07-16 ASSESSMENT — ENCOUNTER SYMPTOMS
PAIN LOCATION: RIGHT LEG
AGGRESSION WITHIN DEFINED LIMITS: 1
DENIES PAIN: 1
ANGER WITHIN DEFINED LIMITS: 1
PAIN LOCATION - PAIN SEVERITY: 0/10
SHORTNESS OF BREATH: 1
PAIN LOCATION - PAIN SEVERITY: 0/10
HIGHEST PAIN SEVERITY IN PAST 24 HOURS: 2/10
LOWEST PAIN SEVERITY IN PAST 24 HOURS: 0/10
PAIN LOCATION: LEFT ARM
PERSON REPORTING PAIN: PATIENT
SLEEP QUALITY: ADEQUATE
PAIN LOCATION - PAIN SEVERITY: 0/10
PAIN LOCATION: RIGHT ARM

## 2025-07-17 ENCOUNTER — HOME CARE VISIT (OUTPATIENT)
Dept: HOME HEALTH SERVICES | Facility: HOME HEALTH | Age: 85
End: 2025-07-17
Payer: COMMERCIAL

## 2025-07-17 VITALS
HEART RATE: 63 BPM | DIASTOLIC BLOOD PRESSURE: 70 MMHG | RESPIRATION RATE: 18 BRPM | TEMPERATURE: 98.8 F | OXYGEN SATURATION: 99 % | SYSTOLIC BLOOD PRESSURE: 148 MMHG

## 2025-07-17 PROCEDURE — G0156 HHCP-SVS OF AIDE,EA 15 MIN: HCPCS

## 2025-07-17 PROCEDURE — G0299 HHS/HOSPICE OF RN EA 15 MIN: HCPCS

## 2025-07-18 ENCOUNTER — HOME CARE VISIT (OUTPATIENT)
Dept: HOME HEALTH SERVICES | Facility: HOME HEALTH | Age: 85
End: 2025-07-18
Payer: COMMERCIAL

## 2025-07-18 VITALS — TEMPERATURE: 98.6 F | HEART RATE: 68 BPM | OXYGEN SATURATION: 98 % | RESPIRATION RATE: 16 BRPM

## 2025-07-18 PROCEDURE — G0151 HHCP-SERV OF PT,EA 15 MIN: HCPCS

## 2025-07-18 PROCEDURE — G0156 HHCP-SVS OF AIDE,EA 15 MIN: HCPCS

## 2025-07-18 SDOH — HEALTH STABILITY: PHYSICAL HEALTH: EXERCISE TYPE: GENERAL STRENGTHENING TO MAJOR MUSCLE GROUPS

## 2025-07-18 ASSESSMENT — ENCOUNTER SYMPTOMS
SUBJECTIVE PAIN PROGRESSION: GRADUALLY IMPROVING
PAIN LOCATION: GENERALIZED
PAIN: 1
PERSON REPORTING PAIN: PATIENT
PAIN SEVERITY GOAL: 1/10
ARTHRALGIAS: 1
HIGHEST PAIN SEVERITY IN PAST 24 HOURS: 5/10
OCCASIONAL FEELINGS OF UNSTEADINESS: 1
LOWEST PAIN SEVERITY IN PAST 24 HOURS: 2/10
PAIN LOCATION - PAIN FREQUENCY: INTERMITTENT
MUSCLE WEAKNESS: 1
PAIN LOCATION - PAIN SEVERITY: 3/10

## 2025-07-18 ASSESSMENT — ACTIVITIES OF DAILY LIVING (ADL)
AMBULATION ASSISTANCE: STAND BY ASSIST
AMBULATION ASSISTANCE ON FLAT SURFACES: 1

## 2025-07-18 ASSESSMENT — PAIN SCALES - PAIN ASSESSMENT IN ADVANCED DEMENTIA (PAINAD)
FACIALEXPRESSION: 2
TOTALSCORE: 6
CONSOLABILITY: 1
NEGVOCALIZATION: 1 - OCCASIONAL MOAN OR GROAN. LOW-LEVEL SPEECH WITH A NEGATIVE OR DISAPPROVING QUALITY.
BODYLANGUAGE: 1 - TENSE. DISTRESSED PACING. FIDGETING.
NEGVOCALIZATION: 1
FACIALEXPRESSION: 2 - FACIAL GRIMACING.
BODYLANGUAGE: 1
BREATHING: 1
CONSOLABILITY: 1 - DISTRACTED OR REASSURED BY VOICE OR TOUCH.

## 2025-07-18 NOTE — CASE COMMUNICATION
Patient was seen in home for PT evaluation visit and treatment  for significant mobility deficits,morbid obesity,and risk of falls.Patient has WC,walker and hospital bed.Patient has potential to imprve functional level,ambulate with walker in home and improve overall strength

## 2025-07-21 ENCOUNTER — HOME CARE VISIT (OUTPATIENT)
Dept: HOME HEALTH SERVICES | Facility: HOME HEALTH | Age: 85
End: 2025-07-21
Payer: COMMERCIAL

## 2025-07-21 VITALS — TEMPERATURE: 98.6 F | HEART RATE: 68 BPM | OXYGEN SATURATION: 92 % | RESPIRATION RATE: 18 BRPM

## 2025-07-21 VITALS
SYSTOLIC BLOOD PRESSURE: 132 MMHG | TEMPERATURE: 97.7 F | HEART RATE: 58 BPM | OXYGEN SATURATION: 96 % | DIASTOLIC BLOOD PRESSURE: 72 MMHG

## 2025-07-21 DIAGNOSIS — M54.50 CHRONIC BILATERAL LOW BACK PAIN WITHOUT SCIATICA: Primary | ICD-10-CM

## 2025-07-21 DIAGNOSIS — G89.29 CHRONIC BILATERAL LOW BACK PAIN WITHOUT SCIATICA: Primary | ICD-10-CM

## 2025-07-21 PROCEDURE — G0151 HHCP-SERV OF PT,EA 15 MIN: HCPCS

## 2025-07-21 PROCEDURE — G0152 HHCP-SERV OF OT,EA 15 MIN: HCPCS

## 2025-07-21 PROCEDURE — G0156 HHCP-SVS OF AIDE,EA 15 MIN: HCPCS

## 2025-07-21 SDOH — HEALTH STABILITY: PHYSICAL HEALTH: EXERCISE TYPE: GENERAL STRENGTHENING

## 2025-07-21 ASSESSMENT — PAIN SCALES - PAIN ASSESSMENT IN ADVANCED DEMENTIA (PAINAD)
NEGVOCALIZATION: 1
CONSOLABILITY: 1 - DISTRACTED OR REASSURED BY VOICE OR TOUCH.
FACIALEXPRESSION: 2 - FACIAL GRIMACING.
BREATHING: 1
BODYLANGUAGE: 1 - TENSE. DISTRESSED PACING. FIDGETING.
NEGVOCALIZATION: 1 - OCCASIONAL MOAN OR GROAN. LOW-LEVEL SPEECH WITH A NEGATIVE OR DISAPPROVING QUALITY.
BODYLANGUAGE: 1
TOTALSCORE: 6
CONSOLABILITY: 1
FACIALEXPRESSION: 2

## 2025-07-21 ASSESSMENT — ACTIVITIES OF DAILY LIVING (ADL)
AMBULATION ASSISTANCE ON FLAT SURFACES: 1
AMBULATION ASSISTANCE: STAND BY ASSIST
AMBULATION ASSISTANCE: ONE PERSON
CURRENT_FUNCTION: STAND BY ASSIST
CURRENT_FUNCTION: ONE PERSON

## 2025-07-21 ASSESSMENT — ENCOUNTER SYMPTOMS
PERSON REPORTING PAIN: PATIENT
DENIES PAIN: 1
LOWER EXTREMITY EDEMA: 1
PAIN LOCATION - PAIN QUALITY: ACHE
BOWEL INCONTINENCE: 1
DEPRESSION: 0
LOWEST PAIN SEVERITY IN PAST 24 HOURS: 2/10
PAIN LOCATION: HEAD
HIGHEST PAIN SEVERITY IN PAST 24 HOURS: 3/10
HYPERTENSION: 1
PAIN LOCATION - PAIN FREQUENCY: INTERMITTENT
PAIN LOCATION: BACK
LOSS OF SENSATION IN FEET: 0
PAIN: 1
APPETITE LEVEL: GOOD
PAIN LOCATION - PAIN FREQUENCY: INTERMITTENT
LOWEST PAIN SEVERITY IN PAST 24 HOURS: 0/10
PAIN LOCATION - RELIEVING FACTORS: REST,MEDS
PERSON REPORTING PAIN: PATIENT
PAIN LOCATION - PAIN SEVERITY: 1/10
MUSCLE WEAKNESS: 1
PAIN SEVERITY GOAL: 0/10
PAIN: 1
SUBJECTIVE PAIN PROGRESSION: WAXING AND WANING
PAIN LOCATION - PAIN SEVERITY: 5/10
MUSCLE WEAKNESS: 1
HIGHEST PAIN SEVERITY IN PAST 24 HOURS: 5/10
OCCASIONAL FEELINGS OF UNSTEADINESS: 1
CHANGE IN APPETITE: UNCHANGED
PAIN LOCATION - PAIN QUALITY: ACHE

## 2025-07-24 ENCOUNTER — HOME CARE VISIT (OUTPATIENT)
Dept: HOME HEALTH SERVICES | Facility: HOME HEALTH | Age: 85
End: 2025-07-24
Payer: COMMERCIAL

## 2025-07-24 VITALS
RESPIRATION RATE: 20 BRPM | SYSTOLIC BLOOD PRESSURE: 166 MMHG | TEMPERATURE: 97.9 F | OXYGEN SATURATION: 96 % | DIASTOLIC BLOOD PRESSURE: 98 MMHG | HEART RATE: 60 BPM

## 2025-07-24 PROCEDURE — G0300 HHS/HOSPICE OF LPN EA 15 MIN: HCPCS

## 2025-07-24 PROCEDURE — G0151 HHCP-SERV OF PT,EA 15 MIN: HCPCS

## 2025-07-24 ASSESSMENT — ENCOUNTER SYMPTOMS
STOOL FREQUENCY: LESS THAN DAILY
LAST BOWEL MOVEMENT: 67408
APPETITE LEVEL: GOOD
CHANGE IN APPETITE: UNCHANGED

## 2025-07-25 VITALS — RESPIRATION RATE: 14 BRPM | TEMPERATURE: 98.6 F

## 2025-07-25 SDOH — HEALTH STABILITY: PHYSICAL HEALTH: EXERCISE TYPE: WRITTEN PROGRAM PROVIDED REVIEWED ,UPDATED HEP

## 2025-07-25 ASSESSMENT — ACTIVITIES OF DAILY LIVING (ADL)
AMBULATION ASSISTANCE: STAND BY ASSIST
CURRENT_FUNCTION: ONE PERSON
AMBULATION ASSISTANCE ON FLAT SURFACES: 1
AMBULATION ASSISTANCE: ONE PERSON
CURRENT_FUNCTION: STAND BY ASSIST

## 2025-07-25 ASSESSMENT — PAIN SCALES - PAIN ASSESSMENT IN ADVANCED DEMENTIA (PAINAD)
BODYLANGUAGE: 1
CONSOLABILITY: 1 - DISTRACTED OR REASSURED BY VOICE OR TOUCH.
FACIALEXPRESSION: 2 - FACIAL GRIMACING.
BREATHING: 1
NEGVOCALIZATION: 1
FACIALEXPRESSION: 2
NEGVOCALIZATION: 1 - OCCASIONAL MOAN OR GROAN. LOW-LEVEL SPEECH WITH A NEGATIVE OR DISAPPROVING QUALITY.
CONSOLABILITY: 1
BODYLANGUAGE: 1 - TENSE. DISTRESSED PACING. FIDGETING.
TOTALSCORE: 6

## 2025-07-25 ASSESSMENT — ENCOUNTER SYMPTOMS
PAIN LOCATION: HEAD
PAIN SEVERITY GOAL: 0/10
PAIN LOCATION - PAIN SEVERITY: 7/10
ARTHRALGIAS: 1
HIGHEST PAIN SEVERITY IN PAST 24 HOURS: 8/10
LIMITED RANGE OF MOTION: 1
PAIN LOCATION - RELIEVING FACTORS: REST
LOWEST PAIN SEVERITY IN PAST 24 HOURS: 2/10
PAIN LOCATION - PAIN QUALITY: ACHING
PAIN LOCATION - PAIN FREQUENCY: CONSTANT
MUSCLE WEAKNESS: 1
SUBJECTIVE PAIN PROGRESSION: WAXING AND WANING
OCCASIONAL FEELINGS OF UNSTEADINESS: 1

## 2025-07-26 ASSESSMENT — ENCOUNTER SYMPTOMS
LIMITED RANGE OF MOTION: 1
MUSCLE WEAKNESS: 1
OCCASIONAL FEELINGS OF UNSTEADINESS: 0
HYPERTENSION: 1

## 2025-07-29 ENCOUNTER — HOME CARE VISIT (OUTPATIENT)
Dept: HOME HEALTH SERVICES | Facility: HOME HEALTH | Age: 85
End: 2025-07-29
Payer: COMMERCIAL

## 2025-07-29 VITALS
TEMPERATURE: 98.4 F | DIASTOLIC BLOOD PRESSURE: 85 MMHG | RESPIRATION RATE: 18 BRPM | OXYGEN SATURATION: 98 % | HEART RATE: 76 BPM | SYSTOLIC BLOOD PRESSURE: 132 MMHG

## 2025-07-29 VITALS
SYSTOLIC BLOOD PRESSURE: 122 MMHG | HEART RATE: 64 BPM | DIASTOLIC BLOOD PRESSURE: 72 MMHG | OXYGEN SATURATION: 96 % | TEMPERATURE: 97.7 F

## 2025-07-29 PROCEDURE — G0152 HHCP-SERV OF OT,EA 15 MIN: HCPCS

## 2025-07-29 PROCEDURE — G0299 HHS/HOSPICE OF RN EA 15 MIN: HCPCS

## 2025-07-29 PROCEDURE — G0156 HHCP-SVS OF AIDE,EA 15 MIN: HCPCS

## 2025-07-29 ASSESSMENT — ENCOUNTER SYMPTOMS
DENIES PAIN: 1
PERSON REPORTING PAIN: PATIENT

## 2025-07-30 ENCOUNTER — HOME CARE VISIT (OUTPATIENT)
Dept: HOME HEALTH SERVICES | Facility: HOME HEALTH | Age: 85
End: 2025-07-30
Payer: COMMERCIAL

## 2025-07-30 PROCEDURE — G0157 HHC PT ASSISTANT EA 15: HCPCS | Mod: CQ

## 2025-07-30 SDOH — HEALTH STABILITY: PHYSICAL HEALTH: EXERCISE TYPE: 60% CARRYOVER

## 2025-07-30 SDOH — HEALTH STABILITY: PHYSICAL HEALTH
EXERCISE COMMENTS: BIL LE SEATED THER EX WITH ORANGE THERBAND RESISTIVE ANKLE DF/PF, HS CURLS, HIP ABDUCTION, MARCHES, LAQ X 20 REPS

## 2025-07-30 ASSESSMENT — ENCOUNTER SYMPTOMS
PAIN LOCATION: RIGHT SHOULDER
PERSON REPORTING PAIN: PATIENT
HIGHEST PAIN SEVERITY IN PAST 24 HOURS: 7/10
PAIN LOCATION - PAIN SEVERITY: 5/10
PAIN: 1

## 2025-07-30 ASSESSMENT — ACTIVITIES OF DAILY LIVING (ADL): AMBULATION ASSISTANCE ON FLAT SURFACES: 1

## 2025-08-01 ENCOUNTER — HOME CARE VISIT (OUTPATIENT)
Dept: HOME HEALTH SERVICES | Facility: HOME HEALTH | Age: 85
End: 2025-08-01
Payer: COMMERCIAL

## 2025-08-01 VITALS
OXYGEN SATURATION: 98 % | SYSTOLIC BLOOD PRESSURE: 138 MMHG | HEART RATE: 60 BPM | TEMPERATURE: 98.6 F | DIASTOLIC BLOOD PRESSURE: 78 MMHG

## 2025-08-01 VITALS — RESPIRATION RATE: 14 BRPM | HEART RATE: 63 BPM | TEMPERATURE: 98.6 F | OXYGEN SATURATION: 98 %

## 2025-08-01 PROCEDURE — G0151 HHCP-SERV OF PT,EA 15 MIN: HCPCS

## 2025-08-01 PROCEDURE — G0156 HHCP-SVS OF AIDE,EA 15 MIN: HCPCS

## 2025-08-01 PROCEDURE — G0152 HHCP-SERV OF OT,EA 15 MIN: HCPCS

## 2025-08-01 SDOH — HEALTH STABILITY: PHYSICAL HEALTH: EXERCISE TYPE: GENERAL STRENGTHENING,ISOMETRICS,ROM,ENDURANCE,

## 2025-08-01 ASSESSMENT — ENCOUNTER SYMPTOMS
MUSCLE WEAKNESS: 1
LOSS OF SENSATION IN FEET: 0
PERSON REPORTING PAIN: PATIENT
ARTHRALGIAS: 1
LIMITED RANGE OF MOTION: 1
DENIES PAIN: 1
OCCASIONAL FEELINGS OF UNSTEADINESS: 1
DENIES PAIN: 1
PERSON REPORTING PAIN: PATIENT
DEPRESSION: 0

## 2025-08-01 ASSESSMENT — ACTIVITIES OF DAILY LIVING (ADL)
AMBULATION ASSISTANCE: STAND BY ASSIST
AMBULATION ASSISTANCE ON FLAT SURFACES: 1

## 2025-08-01 ASSESSMENT — PAIN SCALES - PAIN ASSESSMENT IN ADVANCED DEMENTIA (PAINAD)
CONSOLABILITY: 0 - NO NEED TO CONSOLE.
CONSOLABILITY: 0
FACIALEXPRESSION: 0 - SMILING OR INEXPRESSIVE.
BREATHING: 1
NEGVOCALIZATION: 1 - OCCASIONAL MOAN OR GROAN. LOW-LEVEL SPEECH WITH A NEGATIVE OR DISAPPROVING QUALITY.
FACIALEXPRESSION: 0
BODYLANGUAGE: 0
BODYLANGUAGE: 0 - RELAXED.
TOTALSCORE: 2
NEGVOCALIZATION: 1

## 2025-08-04 ENCOUNTER — HOME CARE VISIT (OUTPATIENT)
Dept: HOME HEALTH SERVICES | Facility: HOME HEALTH | Age: 85
End: 2025-08-04
Payer: COMMERCIAL

## 2025-08-04 VITALS — HEART RATE: 84 BPM | OXYGEN SATURATION: 98 % | TEMPERATURE: 98.6 F | RESPIRATION RATE: 14 BRPM

## 2025-08-04 PROCEDURE — G0151 HHCP-SERV OF PT,EA 15 MIN: HCPCS

## 2025-08-05 ENCOUNTER — HOME CARE VISIT (OUTPATIENT)
Dept: HOME HEALTH SERVICES | Facility: HOME HEALTH | Age: 85
End: 2025-08-05
Payer: COMMERCIAL

## 2025-08-05 PROCEDURE — G0156 HHCP-SVS OF AIDE,EA 15 MIN: HCPCS

## 2025-08-05 ASSESSMENT — ENCOUNTER SYMPTOMS
APPETITE LEVEL: GOOD
MUSCLE WEAKNESS: 1
DENIES PAIN: 1
CHANGE IN APPETITE: UNCHANGED

## 2025-08-06 SDOH — HEALTH STABILITY: PHYSICAL HEALTH: EXERCISE TYPE: GENERAL STRENGTHENING,ENDURANCE,BALANCE

## 2025-08-06 ASSESSMENT — ENCOUNTER SYMPTOMS
LOWEST PAIN SEVERITY IN PAST 24 HOURS: 1/10
ARTHRALGIAS: 1
PERSON REPORTING PAIN: PATIENT
HIGHEST PAIN SEVERITY IN PAST 24 HOURS: 5/10
OCCASIONAL FEELINGS OF UNSTEADINESS: 1
PAIN LOCATION - EXACERBATING FACTORS: STANDING
SUBJECTIVE PAIN PROGRESSION: UNCHANGED
PAIN LOCATION - RELIEVING FACTORS: REST,MEDS
PAIN: 1
PAIN LOCATION - PAIN SEVERITY: 3/10
PAIN LOCATION - PAIN FREQUENCY: INTERMITTENT
MUSCLE WEAKNESS: 1
PAIN LOCATION - PAIN QUALITY: ACHING
PAIN LOCATION: BACK

## 2025-08-06 ASSESSMENT — PAIN SCALES - PAIN ASSESSMENT IN ADVANCED DEMENTIA (PAINAD)
BREATHING: 0
BODYLANGUAGE: 0 - RELAXED.
FACIALEXPRESSION: 2 - FACIAL GRIMACING.
NEGVOCALIZATION: 1 - OCCASIONAL MOAN OR GROAN. LOW-LEVEL SPEECH WITH A NEGATIVE OR DISAPPROVING QUALITY.
FACIALEXPRESSION: 2
TOTALSCORE: 3
CONSOLABILITY: 0 - NO NEED TO CONSOLE.
BODYLANGUAGE: 0
NEGVOCALIZATION: 1
CONSOLABILITY: 0

## 2025-08-06 ASSESSMENT — ACTIVITIES OF DAILY LIVING (ADL)
AMBULATION ASSISTANCE ON FLAT SURFACES: 1
CURRENT_FUNCTION: ONE PERSON
CURRENT_FUNCTION: STAND BY ASSIST
AMBULATION ASSISTANCE: STAND BY ASSIST
AMBULATION ASSISTANCE: ONE PERSON

## 2025-08-07 ENCOUNTER — HOME CARE VISIT (OUTPATIENT)
Dept: HOME HEALTH SERVICES | Facility: HOME HEALTH | Age: 85
End: 2025-08-07
Payer: COMMERCIAL

## 2025-08-07 VITALS — OXYGEN SATURATION: 98 % | TEMPERATURE: 98.6 F | RESPIRATION RATE: 14 BRPM

## 2025-08-07 PROCEDURE — G0151 HHCP-SERV OF PT,EA 15 MIN: HCPCS

## 2025-08-07 SDOH — HEALTH STABILITY: PHYSICAL HEALTH: EXERCISE TYPE: THROUGH FUNCTIONAL MOBILITY TRAINING

## 2025-08-07 ASSESSMENT — ENCOUNTER SYMPTOMS
OCCASIONAL FEELINGS OF UNSTEADINESS: 1
PERSON REPORTING PAIN: PATIENT
MUSCLE WEAKNESS: 1
DENIES PAIN: 1
LIMITED RANGE OF MOTION: 1

## 2025-08-07 ASSESSMENT — PAIN SCALES - PAIN ASSESSMENT IN ADVANCED DEMENTIA (PAINAD)
NEGVOCALIZATION: 1
CONSOLABILITY: 0 - NO NEED TO CONSOLE.
NEGVOCALIZATION: 1 - OCCASIONAL MOAN OR GROAN. LOW-LEVEL SPEECH WITH A NEGATIVE OR DISAPPROVING QUALITY.
FACIALEXPRESSION: 0
BODYLANGUAGE: 0 - RELAXED.
CONSOLABILITY: 0
BREATHING: 1
BODYLANGUAGE: 0
TOTALSCORE: 2
FACIALEXPRESSION: 0 - SMILING OR INEXPRESSIVE.

## 2025-08-07 ASSESSMENT — ACTIVITIES OF DAILY LIVING (ADL)
AMBULATION ASSISTANCE: ONE PERSON
CURRENT_FUNCTION: ONE PERSON
CURRENT_FUNCTION: STAND BY ASSIST
CURRENT_FUNCTION: TWO PERSON
AMBULATION ASSISTANCE ON FLAT SURFACES: 1
AMBULATION ASSISTANCE: STAND BY ASSIST
AMBULATION ASSISTANCE ON UNEVEN SURFACES: 1

## 2025-08-07 NOTE — CASE COMMUNICATION
Patient continues to make progress with mobility in the home.Patient has mattress topper,allowing for more comfort,so is now sleeping in hospital bed.Able to navigate from wc to tub bench with grab bars supervised.Patient able to ambulate with wheeled walker 25 ft cont assist

## 2025-08-08 ENCOUNTER — HOME CARE VISIT (OUTPATIENT)
Dept: HOME HEALTH SERVICES | Facility: HOME HEALTH | Age: 85
End: 2025-08-08
Payer: COMMERCIAL

## 2025-08-08 PROCEDURE — G0156 HHCP-SVS OF AIDE,EA 15 MIN: HCPCS

## 2025-08-11 ENCOUNTER — HOME CARE VISIT (OUTPATIENT)
Dept: HOME HEALTH SERVICES | Facility: HOME HEALTH | Age: 85
End: 2025-08-11
Payer: COMMERCIAL

## 2025-08-11 PROCEDURE — G0156 HHCP-SVS OF AIDE,EA 15 MIN: HCPCS

## 2025-08-12 ENCOUNTER — TELEPHONE (OUTPATIENT)
Dept: GERIATRIC MEDICINE | Facility: CLINIC | Age: 85
End: 2025-08-12
Payer: COMMERCIAL

## 2025-08-12 ENCOUNTER — HOME CARE VISIT (OUTPATIENT)
Dept: HOME HEALTH SERVICES | Facility: HOME HEALTH | Age: 85
End: 2025-08-12
Payer: COMMERCIAL

## 2025-08-12 VITALS — TEMPERATURE: 98.6 F | RESPIRATION RATE: 14 BRPM

## 2025-08-12 DIAGNOSIS — I10 PRIMARY HYPERTENSION: ICD-10-CM

## 2025-08-12 PROCEDURE — G0151 HHCP-SERV OF PT,EA 15 MIN: HCPCS

## 2025-08-12 RX ORDER — ATENOLOL 100 MG/1
50 TABLET ORAL DAILY
Qty: 45 TABLET | Refills: 3 | Status: SHIPPED | OUTPATIENT
Start: 2025-08-12 | End: 2026-08-12

## 2025-08-12 SDOH — HEALTH STABILITY: PHYSICAL HEALTH: EXERCISE TYPE: ISOMETRIC ,GENTLE STRENGTHENING,ENDURANCE

## 2025-08-12 ASSESSMENT — ENCOUNTER SYMPTOMS
PAIN LOCATION: BACK
ARTHRALGIAS: 1
PAIN: 1
PERSON REPORTING PAIN: PATIENT
PAIN SEVERITY GOAL: 0/10
PAIN LOCATION - PAIN QUALITY: ACHING
MUSCLE WEAKNESS: 1
LOWEST PAIN SEVERITY IN PAST 24 HOURS: 1/10
SUBJECTIVE PAIN PROGRESSION: GRADUALLY IMPROVING
PAIN LOCATION - PAIN FREQUENCY: INTERMITTENT
HIGHEST PAIN SEVERITY IN PAST 24 HOURS: 3/10
PAIN LOCATION - PAIN SEVERITY: 2/10
OCCASIONAL FEELINGS OF UNSTEADINESS: 1

## 2025-08-12 ASSESSMENT — PAIN SCALES - PAIN ASSESSMENT IN ADVANCED DEMENTIA (PAINAD)
NEGVOCALIZATION: 1 - OCCASIONAL MOAN OR GROAN. LOW-LEVEL SPEECH WITH A NEGATIVE OR DISAPPROVING QUALITY.
FACIALEXPRESSION: 2
TOTALSCORE: 6
BODYLANGUAGE: 1 - TENSE. DISTRESSED PACING. FIDGETING.
BODYLANGUAGE: 1
CONSOLABILITY: 1
CONSOLABILITY: 1 - DISTRACTED OR REASSURED BY VOICE OR TOUCH.
NEGVOCALIZATION: 1
FACIALEXPRESSION: 2 - FACIAL GRIMACING.
BREATHING: 1

## 2025-08-12 ASSESSMENT — ACTIVITIES OF DAILY LIVING (ADL): AMBULATION ASSISTANCE ON FLAT SURFACES: 1

## 2025-08-14 ENCOUNTER — HOME CARE VISIT (OUTPATIENT)
Dept: HOME HEALTH SERVICES | Facility: HOME HEALTH | Age: 85
End: 2025-08-14
Payer: COMMERCIAL

## 2025-08-14 PROCEDURE — G0156 HHCP-SVS OF AIDE,EA 15 MIN: HCPCS

## 2025-08-15 ENCOUNTER — HOME CARE VISIT (OUTPATIENT)
Dept: HOME HEALTH SERVICES | Facility: HOME HEALTH | Age: 85
End: 2025-08-15
Payer: COMMERCIAL

## 2025-08-15 VITALS — HEART RATE: 72 BPM | RESPIRATION RATE: 14 BRPM | OXYGEN SATURATION: 96 % | TEMPERATURE: 98.6 F

## 2025-08-15 PROCEDURE — G0151 HHCP-SERV OF PT,EA 15 MIN: HCPCS

## 2025-08-15 SDOH — HEALTH STABILITY: PHYSICAL HEALTH: EXERCISE TYPE: GENERAL STRENGTHENING SITTING,STANDING

## 2025-08-15 ASSESSMENT — PAIN SCALES - PAIN ASSESSMENT IN ADVANCED DEMENTIA (PAINAD)
TOTALSCORE: 4
BODYLANGUAGE: 0
NEGVOCALIZATION: 1 - OCCASIONAL MOAN OR GROAN. LOW-LEVEL SPEECH WITH A NEGATIVE OR DISAPPROVING QUALITY.
CONSOLABILITY: 0 - NO NEED TO CONSOLE.
BREATHING: 1
FACIALEXPRESSION: 2
BODYLANGUAGE: 0 - RELAXED.
CONSOLABILITY: 0
FACIALEXPRESSION: 2 - FACIAL GRIMACING.
NEGVOCALIZATION: 1

## 2025-08-15 ASSESSMENT — ENCOUNTER SYMPTOMS
PAIN LOCATION - PAIN FREQUENCY: INTERMITTENT
ARTHRALGIAS: 1
PERSON REPORTING PAIN: PATIENT
SUBJECTIVE PAIN PROGRESSION: WAXING AND WANING
OCCASIONAL FEELINGS OF UNSTEADINESS: 1
PAIN LOCATION - EXACERBATING FACTORS: STANDING
PAIN LOCATION: GENERALIZED
HIGHEST PAIN SEVERITY IN PAST 24 HOURS: 4/10
PAIN LOCATION - PAIN SEVERITY: 3/10
DEPRESSION: 0
PAIN LOCATION - PAIN QUALITY: ACHE
PAIN LOCATION - RELIEVING FACTORS: REST,MEDS
LOSS OF SENSATION IN FEET: 0
PAIN: 1
MUSCLE WEAKNESS: 1

## 2025-08-15 ASSESSMENT — ACTIVITIES OF DAILY LIVING (ADL)
CURRENT_FUNCTION: ONE PERSON
CURRENT_FUNCTION: STAND BY ASSIST
AMBULATION ASSISTANCE ON FLAT SURFACES: 1
AMBULATION ASSISTANCE: STAND BY ASSIST
AMBULATION ASSISTANCE: ONE PERSON

## 2025-08-18 ENCOUNTER — OFFICE VISIT (OUTPATIENT)
Dept: GERIATRIC MEDICINE | Facility: CLINIC | Age: 85
End: 2025-08-18
Payer: COMMERCIAL

## 2025-08-18 DIAGNOSIS — I10 PRIMARY HYPERTENSION: ICD-10-CM

## 2025-08-18 DIAGNOSIS — N18.31 TYPE 2 DIABETES MELLITUS WITH STAGE 3A CHRONIC KIDNEY DISEASE, WITHOUT LONG-TERM CURRENT USE OF INSULIN (MULTI): ICD-10-CM

## 2025-08-18 DIAGNOSIS — E11.22 TYPE 2 DIABETES MELLITUS WITH STAGE 3A CHRONIC KIDNEY DISEASE, WITHOUT LONG-TERM CURRENT USE OF INSULIN (MULTI): ICD-10-CM

## 2025-08-18 DIAGNOSIS — G63 POLYNEUROPATHY ASSOCIATED WITH UNDERLYING DISEASE: ICD-10-CM

## 2025-08-18 DIAGNOSIS — G47.33 OBSTRUCTIVE SLEEP APNEA OF ADULT: ICD-10-CM

## 2025-08-18 DIAGNOSIS — F51.05 INSOMNIA SECONDARY TO DEPRESSION WITH ANXIETY: ICD-10-CM

## 2025-08-18 DIAGNOSIS — F41.8 INSOMNIA SECONDARY TO DEPRESSION WITH ANXIETY: ICD-10-CM

## 2025-08-18 DIAGNOSIS — I50.32 CHRONIC DIASTOLIC CONGESTIVE HEART FAILURE: Primary | ICD-10-CM

## 2025-08-18 DIAGNOSIS — N39.45 CONTINUOUS LEAKAGE OF URINE: ICD-10-CM

## 2025-08-18 PROCEDURE — 99349 HOME/RES VST EST MOD MDM 40: CPT | Performed by: CLINICAL NURSE SPECIALIST

## 2025-08-18 PROCEDURE — 3075F SYST BP GE 130 - 139MM HG: CPT | Performed by: CLINICAL NURSE SPECIALIST

## 2025-08-18 PROCEDURE — 1159F MED LIST DOCD IN RCRD: CPT | Performed by: CLINICAL NURSE SPECIALIST

## 2025-08-18 PROCEDURE — 3079F DIAST BP 80-89 MM HG: CPT | Performed by: CLINICAL NURSE SPECIALIST

## 2025-08-19 ENCOUNTER — HOME CARE VISIT (OUTPATIENT)
Dept: HOME HEALTH SERVICES | Facility: HOME HEALTH | Age: 85
End: 2025-08-19
Payer: COMMERCIAL

## 2025-08-19 VITALS — TEMPERATURE: 98.6 F | RESPIRATION RATE: 14 BRPM | OXYGEN SATURATION: 98 % | HEART RATE: 65 BPM

## 2025-08-19 VITALS
TEMPERATURE: 97.6 F | HEART RATE: 68 BPM | SYSTOLIC BLOOD PRESSURE: 138 MMHG | RESPIRATION RATE: 20 BRPM | DIASTOLIC BLOOD PRESSURE: 84 MMHG

## 2025-08-19 PROCEDURE — G0151 HHCP-SERV OF PT,EA 15 MIN: HCPCS

## 2025-08-19 SDOH — HEALTH STABILITY: PHYSICAL HEALTH: EXERCISE TYPE: SRENGTH,BALANCE,ENDURANCE

## 2025-08-19 ASSESSMENT — PAIN SCALES - PAIN ASSESSMENT IN ADVANCED DEMENTIA (PAINAD)
NEGVOCALIZATION: 0 - NONE.
TOTALSCORE: 0
CONSOLABILITY: 0
FACIALEXPRESSION: 0 - SMILING OR INEXPRESSIVE.
FACIALEXPRESSION: 0
NEGVOCALIZATION: 0
BODYLANGUAGE: 0 - RELAXED.
BODYLANGUAGE: 0
BREATHING: 0
CONSOLABILITY: 0 - NO NEED TO CONSOLE.

## 2025-08-19 ASSESSMENT — ENCOUNTER SYMPTOMS
OCCASIONAL FEELINGS OF UNSTEADINESS: 0
DENIES PAIN: 1
MUSCLE WEAKNESS: 1
PERSON REPORTING PAIN: PATIENT

## 2025-08-19 ASSESSMENT — ACTIVITIES OF DAILY LIVING (ADL)
AMBULATION ASSISTANCE: STAND BY ASSIST
CURRENT_FUNCTION: STAND BY ASSIST
AMBULATION ASSISTANCE ON FLAT SURFACES: 1

## 2025-08-22 ENCOUNTER — HOME CARE VISIT (OUTPATIENT)
Dept: HOME HEALTH SERVICES | Facility: HOME HEALTH | Age: 85
End: 2025-08-22
Payer: COMMERCIAL

## 2025-08-22 VITALS — HEART RATE: 84 BPM | TEMPERATURE: 98.6 F | OXYGEN SATURATION: 98 % | RESPIRATION RATE: 16 BRPM

## 2025-08-22 PROCEDURE — G0151 HHCP-SERV OF PT,EA 15 MIN: HCPCS

## 2025-08-22 SDOH — HEALTH STABILITY: PHYSICAL HEALTH: EXERCISE TYPE: UPDATED HEP

## 2025-08-22 ASSESSMENT — ENCOUNTER SYMPTOMS
PAIN LOCATION - EXACERBATING FACTORS: WALKING
PAIN: 1
PAIN LOCATION - PAIN QUALITY: ACHING
MUSCLE WEAKNESS: 1
OCCASIONAL FEELINGS OF UNSTEADINESS: 1
PAIN LOCATION: BACK
PERSON REPORTING PAIN: PATIENT
PAIN LOCATION - PAIN SEVERITY: 3/10
PAIN SEVERITY GOAL: 0/10
LOWEST PAIN SEVERITY IN PAST 24 HOURS: 1/10
HIGHEST PAIN SEVERITY IN PAST 24 HOURS: 4/10
SUBJECTIVE PAIN PROGRESSION: WAXING AND WANING

## 2025-08-22 ASSESSMENT — ACTIVITIES OF DAILY LIVING (ADL)
AMBULATION ASSISTANCE: ONE PERSON
AMBULATION ASSISTANCE ON FLAT SURFACES: 1
CURRENT_FUNCTION: STAND BY ASSIST
CURRENT_FUNCTION: ONE PERSON
AMBULATION ASSISTANCE: STAND BY ASSIST
AMBULATION ASSISTANCE ON UNEVEN SURFACES: 1

## 2025-08-22 ASSESSMENT — PAIN SCALES - PAIN ASSESSMENT IN ADVANCED DEMENTIA (PAINAD)
TOTALSCORE: 3
BREATHING: 1
NEGVOCALIZATION: 1 - OCCASIONAL MOAN OR GROAN. LOW-LEVEL SPEECH WITH A NEGATIVE OR DISAPPROVING QUALITY.
CONSOLABILITY: 1 - DISTRACTED OR REASSURED BY VOICE OR TOUCH.
FACIALEXPRESSION: 0 - SMILING OR INEXPRESSIVE.
FACIALEXPRESSION: 0
NEGVOCALIZATION: 1
BODYLANGUAGE: 0 - RELAXED.
CONSOLABILITY: 1
BODYLANGUAGE: 0

## 2025-09-03 ENCOUNTER — HOME CARE VISIT (OUTPATIENT)
Dept: HOME HEALTH SERVICES | Facility: HOME HEALTH | Age: 85
End: 2025-09-03
Payer: COMMERCIAL

## 2025-09-03 ENCOUNTER — TELEPHONE (OUTPATIENT)
Dept: GERIATRIC MEDICINE | Facility: CLINIC | Age: 85
End: 2025-09-03
Payer: COMMERCIAL

## 2025-09-03 VITALS — TEMPERATURE: 98.6 F | BODY MASS INDEX: 52.91 KG/M2 | WEIGHT: 280 LBS

## 2025-09-03 DIAGNOSIS — I10 PRIMARY HYPERTENSION: ICD-10-CM

## 2025-09-03 DIAGNOSIS — E78.5 HYPERLIPIDEMIA, UNSPECIFIED HYPERLIPIDEMIA TYPE: ICD-10-CM

## 2025-09-03 DIAGNOSIS — E55.9 VITAMIN D DEFICIENCY: ICD-10-CM

## 2025-09-03 DIAGNOSIS — I50.32 CHRONIC DIASTOLIC CONGESTIVE HEART FAILURE: ICD-10-CM

## 2025-09-03 PROCEDURE — G0151 HHCP-SERV OF PT,EA 15 MIN: HCPCS

## 2025-09-03 RX ORDER — ATORVASTATIN CALCIUM 40 MG/1
40 TABLET, FILM COATED ORAL DAILY
Qty: 90 TABLET | Refills: 3 | Status: SHIPPED | OUTPATIENT
Start: 2025-09-03 | End: 2026-09-03

## 2025-09-03 RX ORDER — ATENOLOL 100 MG/1
50 TABLET ORAL DAILY
Qty: 45 TABLET | Refills: 3 | Status: SHIPPED | OUTPATIENT
Start: 2025-09-03 | End: 2026-09-03

## 2025-09-03 RX ORDER — FUROSEMIDE 40 MG/1
40 TABLET ORAL DAILY
Qty: 90 TABLET | Refills: 3 | Status: SHIPPED | OUTPATIENT
Start: 2025-09-03 | End: 2026-09-03

## 2025-09-03 RX ORDER — LOSARTAN POTASSIUM 100 MG/1
100 TABLET ORAL DAILY
Qty: 90 TABLET | Refills: 3 | Status: SHIPPED | OUTPATIENT
Start: 2025-09-03 | End: 2026-09-03

## 2025-09-03 SDOH — HEALTH STABILITY: PHYSICAL HEALTH: EXERCISE TYPE: GENERAL STRENGTHENING,BALANCE

## 2025-09-03 ASSESSMENT — ENCOUNTER SYMPTOMS
HIGHEST PAIN SEVERITY IN PAST 24 HOURS: 5/10
FATIGUES EASILY: 1
SHORTNESS OF BREATH: T
LOWEST PAIN SEVERITY IN PAST 24 HOURS: 2/10
OCCASIONAL FEELINGS OF UNSTEADINESS: 1
PAIN LOCATION: BACK
SUBJECTIVE PAIN PROGRESSION: WAXING AND WANING
PAIN LOCATION - PAIN SEVERITY: 3/10
DIZZINESS: 1
PERSON REPORTING PAIN: PATIENT
PAIN LOCATION - RELIEVING FACTORS: REST,MEDS
PAIN: 1

## 2025-09-03 ASSESSMENT — ACTIVITIES OF DAILY LIVING (ADL)
OASIS_M1830: 03
AMBULATION ASSISTANCE: STAND BY ASSIST
AMBULATION ASSISTANCE ON FLAT SURFACES: 1
AMBULATION ASSISTANCE ON UNEVEN SURFACES: 1
CURRENT_FUNCTION: STAND BY ASSIST
ENTERING_EXITING_HOME: NEEDS ASSISTANCE

## 2025-09-03 ASSESSMENT — PAIN SCALES - PAIN ASSESSMENT IN ADVANCED DEMENTIA (PAINAD)
FACIALEXPRESSION: 2
BODYLANGUAGE: 1 - TENSE. DISTRESSED PACING. FIDGETING.
FACIALEXPRESSION: 2 - FACIAL GRIMACING.
NEGVOCALIZATION: 1 - OCCASIONAL MOAN OR GROAN. LOW-LEVEL SPEECH WITH A NEGATIVE OR DISAPPROVING QUALITY.
BODYLANGUAGE: 1
NEGVOCALIZATION: 1
TOTALSCORE: 6
CONSOLABILITY: 1 - DISTRACTED OR REASSURED BY VOICE OR TOUCH.
BREATHING: 1
CONSOLABILITY: 1

## 2025-09-04 SDOH — HEALTH STABILITY: PHYSICAL HEALTH
EXERCISE COMMENTS: PATIENT CAN PERFORM HEP,BUT STRUGGLES TO STAY MOTIVATED TO PERFORM DUE TO LIMITED ENDURANCE,MORBID OBESITY

## 2025-09-04 ASSESSMENT — ENCOUNTER SYMPTOMS
PAIN SEVERITY GOAL: 0/10
DEPRESSION: 0
ARTHRALGIAS: 1
MUSCLE WEAKNESS: 1
LOWER EXTREMITY EDEMA: 1
LOSS OF SENSATION IN FEET: 1